# Patient Record
Sex: FEMALE | Race: WHITE | Employment: UNEMPLOYED | URBAN - NONMETROPOLITAN AREA
[De-identification: names, ages, dates, MRNs, and addresses within clinical notes are randomized per-mention and may not be internally consistent; named-entity substitution may affect disease eponyms.]

---

## 2020-02-11 ENCOUNTER — HOSPITAL ENCOUNTER (OUTPATIENT)
Age: 20
Discharge: HOME | End: 2020-02-11
Attending: OBSTETRICS & GYNECOLOGY | Admitting: OBSTETRICS & GYNECOLOGY
Payer: MEDICAID

## 2020-02-11 VITALS
RESPIRATION RATE: 18 BRPM | DIASTOLIC BLOOD PRESSURE: 77 MMHG | TEMPERATURE: 97.7 F | SYSTOLIC BLOOD PRESSURE: 131 MMHG | BODY MASS INDEX: 45.99 KG/M2 | HEIGHT: 67 IN | OXYGEN SATURATION: 99 % | HEART RATE: 98 BPM | WEIGHT: 293 LBS

## 2020-02-11 PROBLEM — R10.9 ABDOMINAL PAIN: Status: ACTIVE | Noted: 2020-02-11

## 2020-02-11 LAB
BILIRUBIN URINE: NEGATIVE
BLOOD, URINE: NEGATIVE
CHARACTER, URINE: CLEAR
COLOR: YELLOW
GLUCOSE URINE: NEGATIVE MG/DL
KETONES, URINE: NEGATIVE
LEUKOCYTE ESTERASE, URINE: NEGATIVE
NITRITE, URINE: NEGATIVE
PH UA: 6.5 (ref 5–9)
PROTEIN UA: NEGATIVE
SPECIFIC GRAVITY, URINE: 1.01 (ref 1–1.03)
UROBILINOGEN, URINE: 0.2 EU/DL (ref 0–1)

## 2020-02-11 PROCEDURE — 2709999900 HC NON-CHARGEABLE SUPPLY

## 2020-02-11 PROCEDURE — 81003 URINALYSIS AUTO W/O SCOPE: CPT

## 2020-02-11 RX ORDER — FERROUS SULFATE 325(65) MG
325 TABLET ORAL
COMMUNITY
End: 2021-09-03

## 2020-02-11 SDOH — HEALTH STABILITY: MENTAL HEALTH: HOW OFTEN DO YOU HAVE A DRINK CONTAINING ALCOHOL?: NEVER

## 2020-02-11 NOTE — FLOWSHEET NOTE
Pt arrives with complaint of sharp pain in lower back for several days, states it is constant, but hasn't had it the entire time, has not tried anything for pain such as tylenol or heating pad, also complaint of abdominal pressure that is constant in her \"crotch\", denies uti symptoms, has been feeling baby move, denies cramping and contractions, denies rom, also states that she had a 4D ultrasound done on 1/24/20 and was told she was 35 weeks then, reviewed prenatal and pt had early ultrasound done and would be 35 2/7 weeks now, pt and her mother say that she is tired of being pregnant. Denies headache, blurred vision and epigastric pain. Explained patients right to have family, representative or physician notified of their admission. Patient has Declined for physician to be notified. Patient has Declined for family/representative to be notified.

## 2020-02-11 NOTE — FLOWSHEET NOTE
Discharge instructions given and pt verbalizes understanding, instructed that she could use heating pad on low, warm bath and take tylenol if needed for pain and discomfort,

## 2020-02-11 NOTE — FLOWSHEET NOTE
Plan of care discussed, pt and her mother verbalize understanding, then straight cath for moderate amount clear yellow urine and specimen to lab

## 2020-02-18 ENCOUNTER — HOSPITAL ENCOUNTER (OUTPATIENT)
Age: 20
Discharge: HOME | End: 2020-02-18
Attending: OBSTETRICS & GYNECOLOGY | Admitting: OBSTETRICS & GYNECOLOGY
Payer: MEDICAID

## 2020-02-18 VITALS
BODY MASS INDEX: 51.69 KG/M2 | DIASTOLIC BLOOD PRESSURE: 79 MMHG | RESPIRATION RATE: 22 BRPM | TEMPERATURE: 97.6 F | SYSTOLIC BLOOD PRESSURE: 139 MMHG | WEIGHT: 293 LBS | HEART RATE: 88 BPM

## 2020-02-18 PROBLEM — R10.9 ABDOMINAL PAIN AFFECTING PREGNANCY: Status: ACTIVE | Noted: 2020-02-18

## 2020-02-18 PROBLEM — O26.899 ABDOMINAL PAIN AFFECTING PREGNANCY: Status: ACTIVE | Noted: 2020-02-18

## 2020-02-18 LAB
BILIRUBIN URINE: NEGATIVE
BLOOD, URINE: NEGATIVE
CHARACTER, URINE: CLEAR
COLOR: YELLOW
GLUCOSE URINE: NEGATIVE MG/DL
KETONES, URINE: NEGATIVE
LEUKOCYTE ESTERASE, URINE: NEGATIVE
NITRITE, URINE: NEGATIVE
PH UA: 7 (ref 5–9)
PROTEIN UA: NEGATIVE
SPECIFIC GRAVITY, URINE: 1 (ref 1–1.03)
UROBILINOGEN, URINE: 0.2 EU/DL (ref 0–1)

## 2020-02-18 PROCEDURE — 2709999900 HC NON-CHARGEABLE SUPPLY

## 2020-02-18 PROCEDURE — 81003 URINALYSIS AUTO W/O SCOPE: CPT

## 2020-02-18 PROCEDURE — 6370000000 HC RX 637 (ALT 250 FOR IP): Performed by: OBSTETRICS & GYNECOLOGY

## 2020-02-18 RX ORDER — ACETAMINOPHEN 500 MG
1000 TABLET ORAL ONCE
Status: COMPLETED | OUTPATIENT
Start: 2020-02-18 | End: 2020-02-18

## 2020-02-18 RX ADMIN — ACETAMINOPHEN 1000 MG: 500 TABLET ORAL at 19:14

## 2020-02-18 ASSESSMENT — PAIN DESCRIPTION - DESCRIPTORS: DESCRIPTORS: CONSTANT

## 2020-02-18 NOTE — FLOWSHEET NOTE
Plan of care discussed with pt. Cath urine and tylenol. States she does not have to urinate yet. Instructed to call when she has to go.

## 2020-02-18 NOTE — FLOWSHEET NOTE
Gr 2 P 0 admitted to labor and delivery at 36 wks 2 days complaining of pain of 6 on right side of abdomen. Abdomen tender when palpating entire right side where baby is lying. States pain in worse when standing. EFM applied. Also stated she has a headache of 7. Has not taken anything for pain.

## 2020-02-19 NOTE — FLOWSHEET NOTE
Dr. Taina Jackson on the phone. Informed on urine results WDL. EFM reactive. No contractions noted. Pt resting comfortably. Tylenol given. Orders received. Over the last 2 weeks, how often have you been bothered by the following problems?         PHQ2 Score:  0  1. Little interest or pleasure in doing things?:  Not at all  2. Feeling down, depressed, or hopeless?:  Not at all     3. Trouble falling, staying asleep or sleeping too much?:  Not at all  4. Feeling tired or having little energy?:  Not at all  5. Poor appetite or overeating?:  Not at all  7. Trouble concentrating on things such as reading a newspaper or watching television?:  Not at all  8. Moving or speaking so slowly that other people could have noticed? Or the opposite - being so fidgety or restless that you were moving around a lot more than usual?:  Not at all  9. Thoughts that you would be better off dead, or of hurting yourself in some way?:  Not at all

## 2020-02-19 NOTE — PLAN OF CARE
Problem: Pain:  Goal: Pain level will decrease  Description  Pain level will decrease  Outcome: Ongoing  Note:   Monitoring pt's pain. Medications given. Oral hydration encouraged. Goal: Control of acute pain  Description  Control of acute pain  Outcome: Ongoing  Goal: Control of chronic pain  Description  Control of chronic pain  Outcome: Ongoing     Problem: Healthcare acquired conditions:  Goal: Absence of healthcare acquired conditions  Description  Absence of healthcare acquired conditions  Outcome: Ongoing  Note:   EFM reactive. Vitals stable,      Problem: Discharge Planning:  Goal: Discharged to appropriate level of care  Description  Discharged to appropriate level of care  Outcome: Ongoing  Note:   Monitoring pt's discharge needs. Plans to be discharged when medically stable. Care plan reviewed with patient and family. Patient and family verbalize understanding of the plan of care and contribute to goal setting.

## 2020-02-28 ENCOUNTER — HOSPITAL ENCOUNTER (OUTPATIENT)
Age: 20
Discharge: HOME | End: 2020-02-28
Attending: OBSTETRICS & GYNECOLOGY | Admitting: OBSTETRICS & GYNECOLOGY
Payer: MEDICAID

## 2020-02-28 VITALS
DIASTOLIC BLOOD PRESSURE: 97 MMHG | SYSTOLIC BLOOD PRESSURE: 135 MMHG | BODY MASS INDEX: 45.99 KG/M2 | HEART RATE: 97 BPM | OXYGEN SATURATION: 98 % | TEMPERATURE: 97.9 F | WEIGHT: 293 LBS | HEIGHT: 67 IN | RESPIRATION RATE: 16 BRPM

## 2020-02-28 PROBLEM — O26.90 PREGNANCY WITH COMPLICATION: Status: ACTIVE | Noted: 2020-02-28

## 2020-02-28 LAB
ALBUMIN SERPL-MCNC: 3.6 G/DL (ref 3.5–5.1)
ALP BLD-CCNC: 104 U/L (ref 38–126)
ALT SERPL-CCNC: 7 U/L (ref 11–66)
AST SERPL-CCNC: 10 U/L (ref 5–40)
BILIRUB SERPL-MCNC: 0.4 MG/DL (ref 0.3–1.2)
BILIRUBIN DIRECT: < 0.2 MG/DL (ref 0–0.3)
CREATININE URINE: 218.3 MG/DL
ERYTHROCYTE [DISTWIDTH] IN BLOOD BY AUTOMATED COUNT: 15.8 % (ref 11.5–14.5)
ERYTHROCYTE [DISTWIDTH] IN BLOOD BY AUTOMATED COUNT: 51.1 FL (ref 35–45)
HCT VFR BLD CALC: 31.6 % (ref 37–47)
HEMOGLOBIN: 10.2 GM/DL (ref 12–16)
MCH RBC QN AUTO: 28.9 PG (ref 26–33)
MCHC RBC AUTO-ENTMCNC: 32.3 GM/DL (ref 32.2–35.5)
MCV RBC AUTO: 89.5 FL (ref 81–99)
PLATELET # BLD: 310 THOU/MM3 (ref 130–400)
PMV BLD AUTO: 9 FL (ref 9.4–12.4)
PROT/CREAT RATIO, UR: 0.2
PROTEIN, URINE: 44.7 MG/DL
RBC # BLD: 3.53 MILL/MM3 (ref 4.2–5.4)
TOTAL PROTEIN: 6.4 G/DL (ref 6.1–8)
WBC # BLD: 14.5 THOU/MM3 (ref 4.8–10.8)

## 2020-02-28 PROCEDURE — 36415 COLL VENOUS BLD VENIPUNCTURE: CPT

## 2020-02-28 PROCEDURE — 82570 ASSAY OF URINE CREATININE: CPT

## 2020-02-28 PROCEDURE — 6370000000 HC RX 637 (ALT 250 FOR IP): Performed by: OBSTETRICS & GYNECOLOGY

## 2020-02-28 PROCEDURE — 84156 ASSAY OF PROTEIN URINE: CPT

## 2020-02-28 PROCEDURE — 80076 HEPATIC FUNCTION PANEL: CPT

## 2020-02-28 PROCEDURE — 85027 COMPLETE CBC AUTOMATED: CPT

## 2020-02-28 RX ORDER — BUTALBITAL, ACETAMINOPHEN AND CAFFEINE 50; 325; 40 MG/1; MG/1; MG/1
2 TABLET ORAL ONCE
Status: COMPLETED | OUTPATIENT
Start: 2020-02-28 | End: 2020-02-28

## 2020-02-28 RX ADMIN — BUTALBITAL, ACETAMINOPHEN, AND CAFFEINE 2 TABLET: 50; 325; 40 TABLET ORAL at 16:44

## 2020-02-28 ASSESSMENT — PAIN SCALES - GENERAL: PAINLEVEL_OUTOF10: 7

## 2020-02-28 ASSESSMENT — PAIN DESCRIPTION - DESCRIPTORS: DESCRIPTORS: CONSTANT

## 2020-02-28 NOTE — FLOWSHEET NOTE
Patient to bathroom to void, informed of maternal drug testing policy in place on all laboring patients. Verbal consent received, paper consent to be signed and urine to be sent. Explained patients right to have family, representative or physician notified of their admission. Patient has Declined for physician to be notified. Patient has Declined for family/representative to be notified.

## 2020-02-28 NOTE — FLOWSHEET NOTE
Pt discharged to home with instructions.  Pt states understanding and is aware of when to return to hospital.

## 2020-02-29 NOTE — PROCEDURES
Department of Obstetrics and Gynecology  Labor and Delivery   Triage Note  Colt Frias D.O.      Pt Name: Debra Rinne  MRN: [id number] [place] #: [de-identified]  YOB: 2000  Date of Service 20    SUBJECTIVE: This 23yo  patient presented at 28 week+2 complaining of abdominal pressure and back pain. Workup was negative    OBJECTIVE    Fetal heart rate:         Baseline Heart Rate:  150        Accelerations:  present       Decelerations:  none       Variability:  moderate    Contraction frequency: nil  The NST was reactive level 1      ASSESSMENT & PLAN:  Discharged to home in a stable condition and instructed to follow up at her next scheduled appointment. Romeo GUILLEN

## 2020-03-04 ENCOUNTER — APPOINTMENT (OUTPATIENT)
Dept: GENERAL RADIOLOGY | Age: 20
End: 2020-03-04
Payer: MEDICAID

## 2020-03-04 ENCOUNTER — HOSPITAL ENCOUNTER (EMERGENCY)
Age: 20
Discharge: HOME | End: 2020-03-04
Payer: MEDICAID

## 2020-03-04 VITALS
TEMPERATURE: 98.4 F | BODY MASS INDEX: 45.99 KG/M2 | HEART RATE: 80 BPM | SYSTOLIC BLOOD PRESSURE: 146 MMHG | WEIGHT: 293 LBS | RESPIRATION RATE: 14 BRPM | DIASTOLIC BLOOD PRESSURE: 91 MMHG | OXYGEN SATURATION: 97 % | HEIGHT: 67 IN
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
## 2020-03-04 PROCEDURE — 6370000000 HC RX 637 (ALT 250 FOR IP): Performed by: PHYSICIAN ASSISTANT

## 2020-03-04 PROCEDURE — 73610 X-RAY EXAM OF ANKLE: CPT

## 2020-03-04 PROCEDURE — 99285 EMERGENCY DEPT VISIT HI MDM: CPT

## 2020-03-04 PROCEDURE — 73110 X-RAY EXAM OF WRIST: CPT

## 2020-03-04 RX ORDER — ACETAMINOPHEN 325 MG/1
650 TABLET ORAL ONCE
Status: COMPLETED | OUTPATIENT
Start: 2020-03-04 | End: 2020-03-04

## 2020-03-04 RX ADMIN — ACETAMINOPHEN 650 MG: 325 TABLET ORAL at 11:04

## 2020-03-04 ASSESSMENT — ENCOUNTER SYMPTOMS: ABDOMINAL PAIN: 0

## 2020-03-04 ASSESSMENT — PAIN DESCRIPTION - LOCATION
LOCATION: WRIST;ANKLE
LOCATION: WRIST;ANKLE

## 2020-03-04 ASSESSMENT — PAIN SCALES - GENERAL
PAINLEVEL_OUTOF10: 8
PAINLEVEL_OUTOF10: 6
PAINLEVEL_OUTOF10: 8

## 2020-03-04 ASSESSMENT — PAIN DESCRIPTION - ORIENTATION
ORIENTATION: RIGHT
ORIENTATION: RIGHT

## 2020-03-04 ASSESSMENT — PAIN DESCRIPTION - PROGRESSION: CLINICAL_PROGRESSION: GRADUALLY IMPROVING

## 2020-03-04 ASSESSMENT — PAIN DESCRIPTION - PAIN TYPE
TYPE: ACUTE PAIN
TYPE: ACUTE PAIN

## 2020-03-04 ASSESSMENT — PAIN DESCRIPTION - FREQUENCY
FREQUENCY: CONTINUOUS
FREQUENCY: CONTINUOUS

## 2020-03-04 ASSESSMENT — PAIN DESCRIPTION - DESCRIPTORS
DESCRIPTORS: ACHING
DESCRIPTORS: ACHING

## 2020-03-04 NOTE — ED PROVIDER NOTES
oropharyngeal exudate or posterior oropharyngeal erythema. Eyes:      Conjunctiva/sclera: Conjunctivae normal.   Neck:      Musculoskeletal: Normal range of motion and neck supple. Vascular: No JVD. Cardiovascular:      Rate and Rhythm: Normal rate and regular rhythm. Pulses: Normal pulses. Heart sounds: Normal heart sounds. No murmur. No friction rub. No gallop. Pulmonary:      Effort: Pulmonary effort is normal. No respiratory distress. Breath sounds: Normal breath sounds. No decreased breath sounds, wheezing, rhonchi or rales. Abdominal:      General: Bowel sounds are normal. There is no distension. Palpations: Abdomen is soft. Tenderness: There is no abdominal tenderness. There is no guarding or rebound. Musculoskeletal:      Right wrist: She exhibits decreased range of motion and tenderness (snuff box ). Right ankle: She exhibits decreased range of motion and swelling. Tenderness. Skin:     General: Skin is warm and dry. Findings: No rash. Neurological:      Mental Status: She is alert and oriented to person, place, and time. Motor: No abnormal muscle tone. Coordination: Coordination normal.         DIAGNOSTIC RESULTS     EKG:(none if blank)  All EKG's are interpreted [facility] who either signs or Co-signs this chart in the absence of a cardiologist.    none    RADIOLOGY: (none if blank)   Interpretation per the Radiologistbelow, if available at the time of this note:    XR ANKLE RIGHT (MIN 3 VIEWS)   Final Result   No acute abnormality. **This report has been created using voice recognition software. It may contain minor errors which are inherent in voice recognition technology. **      Final report electronically signed by Dr. Nicko Garzon on 3/4/2020 10:31 AM      XR WRIST RIGHT (MIN 3 VIEWS)   Final Result   No acute process            **This report has been created using voice recognition software.   It may contain minor errors which are inherent in voice recognition technology. **      Final report electronically signed by Dr. Apoorva Enciso on 3/4/2020 10:32 AM          LABS:  Labs Reviewed - No data to display    All other labs were within normal range or not returned as of this dictation. Please note, any cultures that may have been sent were not resulted at the time of this patient visit. EMERGENCY DEPARTMENT COURSE andMedical Decision Making:     MDM/   Is a 25-year-old female patient who is currently 38 weeks pregnant. Patient was evaluated in labor and delivery and the patient's OB/GYN was contacted. At this time there is no risk to the baby, and vitals were within normal limits so the patient was returned to the ED for application of splints to her affected areas. Due to the patient having snuffbox tenderness we will apply a thumb spica splint and recommend that she follow-up with orthopedics within the following week for evaluation for a possible scaphoid fracture. The patient's right hand and wrist x-ray was negative. The patient's right ankle and foot x-rays were negative. Some swelling noted about the lateral right ankle however there was no bruising within at this time. The patient's injury is consistent with a grade 1 ankle sprain. Recommend that the patient be placed in a stirrup splint or ankle stabilization. I recommend that she follow-up with [facility] for evaluation of her ankle as well. I educated the patient on avoidance of use of NSAIDs as this could pose a risk to her baby. Patient states that she normally does not take anything for pain however I told her it would be okay to take Tylenol as directed by her OB/GYN. Patient was in agreement with this plan and demonstrated understanding. Strict return precautions and follow up instructions were discussed with the patient with which the patient agrees. Dr. Norbert Desouza was present and participated in the care of this patient.      ED Medications administered this visit:    Medications   acetaminophen (TYLENOL) tablet 650 mg (650 mg Oral Given 3/4/20 1106)         Procedures: (None if blank)       CLINICAL       1. Mild sprain of right ankle, initial encounter    2. Wrist pain, acute, right          DISPOSITION/PLAN   DISPOSITION    Home    PATIENT [facility]:  [facility] [place] [address] [facility]  [phone]  Schedule an appointment as soon as possible for a visit in 3 days  For wrist evaluation r/t possible scaphoid fx and ankle f/u      DISCHARGE MEDICATIONS:  New Prescriptions    No medications on file              (Please note that portions of this note were completed with a voice recognition program.  Efforts were made to edit the dictations but occasionallywords are mis-transcribed.)    Michell Graff PA-C   (electronically signed)      Scribe:  Royer Delaney(Name) 3/4/20 10:13 AM Scribing for and in the presence of Michell Graff PA-C.     Signed by: Royer Delaney(Name), [place], 03/04/20 12:00 PM         RASHI Mcneal  03/04/20 1200

## 2020-03-04 NOTE — ED TRIAGE NOTES
Patient presents to ER with complaints of right ankle injury and right wrist pain after having a fall this morning at 0430. Patient reports being 38 weeks and 4 days pregnant.  Patient was already evaluated by labor & delivery and brought down for further evaluation of ankle and wrist.

## 2020-03-05 ENCOUNTER — HOSPITAL ENCOUNTER (OUTPATIENT)
Age: 20
Discharge: HOME | End: 2020-03-05
Attending: OBSTETRICS & GYNECOLOGY | Admitting: OBSTETRICS & GYNECOLOGY
Payer: MEDICAID

## 2020-03-05 ENCOUNTER — HOSPITAL ENCOUNTER (EMERGENCY)
Age: 20
Discharge: HOME | End: 2020-03-05
Payer: MEDICAID

## 2020-03-05 VITALS
HEART RATE: 85 BPM | OXYGEN SATURATION: 98 % | SYSTOLIC BLOOD PRESSURE: 132 MMHG | DIASTOLIC BLOOD PRESSURE: 84 MMHG | BODY MASS INDEX: 38.45 KG/M2 | WEIGHT: 245 LBS | RESPIRATION RATE: 16 BRPM | HEIGHT: 67 IN | TEMPERATURE: 98.6 F

## 2020-03-05 VITALS
RESPIRATION RATE: 18 BRPM | HEART RATE: 78 BPM | WEIGHT: 293 LBS | TEMPERATURE: 97.1 F | DIASTOLIC BLOOD PRESSURE: 83 MMHG | SYSTOLIC BLOOD PRESSURE: 145 MMHG | HEIGHT: 67 IN | OXYGEN SATURATION: 98 % | BODY MASS INDEX: 45.99 KG/M2

## 2020-03-05 PROBLEM — R42 DIZZY: Status: ACTIVE | Noted: 2020-03-05

## 2020-03-05 LAB
ABO: NORMAL
ALBUMIN SERPL-MCNC: 3.3 G/DL (ref 3.5–5.1)
ALP BLD-CCNC: 109 U/L (ref 38–126)
ALT SERPL-CCNC: 7 U/L (ref 11–66)
ANION GAP SERPL CALCULATED.3IONS-SCNC: 13 MEQ/L (ref 8–16)
ANTIBODY SCREEN: NORMAL
AST SERPL-CCNC: 11 U/L (ref 5–40)
BILIRUB SERPL-MCNC: 0.4 MG/DL (ref 0.3–1.2)
BUN BLDV-MCNC: 9 MG/DL (ref 7–22)
CALCIUM SERPL-MCNC: 9.6 MG/DL (ref 8.5–10.5)
CHLORIDE BLD-SCNC: 103 MEQ/L (ref 98–111)
CO2: 21 MEQ/L (ref 23–33)
CREAT SERPL-MCNC: 0.3 MG/DL (ref 0.4–1.2)
EKG ATRIAL RATE: 79 BPM
EKG ATRIAL RATE: 84 BPM
EKG P AXIS: 30 DEGREES
EKG P AXIS: 46 DEGREES
EKG P-R INTERVAL: 146 MS
EKG P-R INTERVAL: 150 MS
EKG Q-T INTERVAL: 360 MS
EKG Q-T INTERVAL: 366 MS
EKG QRS DURATION: 74 MS
EKG QRS DURATION: 76 MS
EKG QTC CALCULATION (BAZETT): 412 MS
EKG QTC CALCULATION (BAZETT): 432 MS
EKG R AXIS: 33 DEGREES
EKG R AXIS: 42 DEGREES
EKG T AXIS: 27 DEGREES
EKG T AXIS: 31 DEGREES
EKG VENTRICULAR RATE: 79 BPM
EKG VENTRICULAR RATE: 84 BPM
ERYTHROCYTE [DISTWIDTH] IN BLOOD BY AUTOMATED COUNT: 15.5 % (ref 11.5–14.5)
ERYTHROCYTE [DISTWIDTH] IN BLOOD BY AUTOMATED COUNT: 50.4 FL (ref 35–45)
GFR SERPL CREATININE-BSD FRML MDRD: > 90 ML/MIN/1.73M2
GLUCOSE BLD-MCNC: 83 MG/DL (ref 70–108)
HCT VFR BLD CALC: 32.9 % (ref 37–47)
HEMOGLOBIN: 10.6 GM/DL (ref 12–16)
MCH RBC QN AUTO: 28.9 PG (ref 26–33)
MCHC RBC AUTO-ENTMCNC: 32.2 GM/DL (ref 32.2–35.5)
MCV RBC AUTO: 89.6 FL (ref 81–99)
PLATELET # BLD: 306 THOU/MM3 (ref 130–400)
PMV BLD AUTO: 9.4 FL (ref 9.4–12.4)
POTASSIUM SERPL-SCNC: 4.9 MEQ/L (ref 3.5–5.2)
RBC # BLD: 3.67 MILL/MM3 (ref 4.2–5.4)
RH FACTOR: NORMAL
SODIUM BLD-SCNC: 137 MEQ/L (ref 135–145)
TOTAL PROTEIN: 6.5 G/DL (ref 6.1–8)
WBC # BLD: 13.6 THOU/MM3 (ref 4.8–10.8)

## 2020-03-05 PROCEDURE — 2709999900 HC NON-CHARGEABLE SUPPLY

## 2020-03-05 PROCEDURE — 6360000002 HC RX W HCPCS: Performed by: NURSE PRACTITIONER

## 2020-03-05 PROCEDURE — 2580000003 HC RX 258: Performed by: NURSE PRACTITIONER

## 2020-03-05 PROCEDURE — 85027 COMPLETE CBC AUTOMATED: CPT

## 2020-03-05 PROCEDURE — 93005 ELECTROCARDIOGRAM TRACING: CPT | Performed by: OBSTETRICS & GYNECOLOGY

## 2020-03-05 PROCEDURE — 2580000003 HC RX 258: Performed by: OBSTETRICS & GYNECOLOGY

## 2020-03-05 PROCEDURE — 93005 ELECTROCARDIOGRAM TRACING: CPT | Performed by: NURSE PRACTITIONER

## 2020-03-05 PROCEDURE — 80053 COMPREHEN METABOLIC PANEL: CPT

## 2020-03-05 PROCEDURE — 86900 BLOOD TYPING SEROLOGIC ABO: CPT

## 2020-03-05 PROCEDURE — 36415 COLL VENOUS BLD VENIPUNCTURE: CPT

## 2020-03-05 PROCEDURE — 86850 RBC ANTIBODY SCREEN: CPT

## 2020-03-05 PROCEDURE — 86901 BLOOD TYPING SEROLOGIC RH(D): CPT

## 2020-03-05 PROCEDURE — 6370000000 HC RX 637 (ALT 250 FOR IP): Performed by: NURSE PRACTITIONER

## 2020-03-05 PROCEDURE — 93010 ELECTROCARDIOGRAM REPORT: CPT | Performed by: NUCLEAR MEDICINE

## 2020-03-05 PROCEDURE — 96374 THER/PROPH/DIAG INJ IV PUSH: CPT

## 2020-03-05 PROCEDURE — 86592 SYPHILIS TEST NON-TREP QUAL: CPT

## 2020-03-05 PROCEDURE — 96360 HYDRATION IV INFUSION INIT: CPT

## 2020-03-05 PROCEDURE — 99284 EMERGENCY DEPT VISIT MOD MDM: CPT

## 2020-03-05 PROCEDURE — 96361 HYDRATE IV INFUSION ADD-ON: CPT

## 2020-03-05 PROCEDURE — 99283 EMERGENCY DEPT VISIT LOW MDM: CPT

## 2020-03-05 RX ORDER — SODIUM CHLORIDE, SODIUM LACTATE, POTASSIUM CHLORIDE, CALCIUM CHLORIDE 600; 310; 30; 20 MG/100ML; MG/100ML; MG/100ML; MG/100ML
INJECTION, SOLUTION INTRAVENOUS CONTINUOUS
Status: DISCONTINUED | OUTPATIENT
Start: 2020-03-05 | End: 2020-03-05 | Stop reason: HOSPADM

## 2020-03-05 RX ORDER — 0.9 % SODIUM CHLORIDE 0.9 %
1000 INTRAVENOUS SOLUTION INTRAVENOUS ONCE
Status: COMPLETED | OUTPATIENT
Start: 2020-03-05 | End: 2020-03-05

## 2020-03-05 RX ORDER — METOCLOPRAMIDE HYDROCHLORIDE 5 MG/ML
10 INJECTION INTRAMUSCULAR; INTRAVENOUS ONCE
Status: COMPLETED | OUTPATIENT
Start: 2020-03-05 | End: 2020-03-05

## 2020-03-05 RX ORDER — ACETAMINOPHEN 500 MG
1000 TABLET ORAL ONCE
Status: COMPLETED | OUTPATIENT
Start: 2020-03-05 | End: 2020-03-05

## 2020-03-05 RX ORDER — MECLIZINE HYDROCHLORIDE CHEWABLE TABLETS 25 MG/1
25 TABLET, CHEWABLE ORAL ONCE
Status: COMPLETED | OUTPATIENT
Start: 2020-03-05 | End: 2020-03-05

## 2020-03-05 RX ADMIN — SODIUM CHLORIDE 1000 ML: 9 INJECTION, SOLUTION INTRAVENOUS at 17:59

## 2020-03-05 RX ADMIN — METOCLOPRAMIDE 10 MG: 5 INJECTION, SOLUTION INTRAMUSCULAR; INTRAVENOUS at 17:54

## 2020-03-05 RX ADMIN — SODIUM CHLORIDE, POTASSIUM CHLORIDE, SODIUM LACTATE AND CALCIUM CHLORIDE: 600; 310; 30; 20 INJECTION, SOLUTION INTRAVENOUS at 14:25

## 2020-03-05 RX ADMIN — MECLIZINE HCL 25 MG: 25 TABLET, CHEWABLE ORAL at 17:54

## 2020-03-05 RX ADMIN — SODIUM CHLORIDE, POTASSIUM CHLORIDE, SODIUM LACTATE AND CALCIUM CHLORIDE: 600; 310; 30; 20 INJECTION, SOLUTION INTRAVENOUS at 15:21

## 2020-03-05 RX ADMIN — ACETAMINOPHEN 1000 MG: 500 TABLET ORAL at 19:40

## 2020-03-05 ASSESSMENT — PAIN SCALES - GENERAL
PAINLEVEL_OUTOF10: 8
PAINLEVEL_OUTOF10: 7

## 2020-03-05 ASSESSMENT — PAIN DESCRIPTION - LOCATION: LOCATION: HEAD

## 2020-03-05 ASSESSMENT — PAIN DESCRIPTION - PAIN TYPE: TYPE: ACUTE PAIN

## 2020-03-05 NOTE — FLOWSHEET NOTE
Dr Dar Leonard phoned in and notified of lab results, she viewed than and bp from the office, informed that pt states her dizziness is about the same as when she came in, had 1500 ml iv fluid infused, plan is to have pt evaluated in ER for being dizzy

## 2020-03-05 NOTE — ED NOTES
Pt resting in bed upon entering room and appears in no distress. Pt reports that her headache is 7/10 at this time. Pt informed of further orders placed and completed at this time. Parent at the bedside states concern for pt's BP which was reassessed and remains slightly high. Pt reports that over the last 48 hours she has not taken any tylenol for pain to the right wrist and or ankle. Another ekg obtained at this time. Call light remains in reach with sr up x2.      Annetta Cockayne, RN  03/05/20 3959

## 2020-03-05 NOTE — ED NOTES
Bed: 031A  Expected date: 3/5/20  Expected time: 4:20 PM  Means of arrival:   Comments:  April Lam RN  03/05/20 [id number]

## 2020-03-05 NOTE — ED TRIAGE NOTES
Pt to ed from L&D for further evaluations of dizziness for the last 48 hours. L&D reports that pt was brought to them from Dr. Venu Hassan office for evaluation and fluids. Pt reports that an ekg was obtained in L&D and noted 1600 ml's NS has been received and is still infusing since arrival to the ED. Pt reports headache rating pain 7/10. Pt reports that she was evaluated here 3-4-2020 for a fall that occurred due to the dizziness injuring her right wrist and ankle. Pt reports that she has taken no medications for her pain. Pt waiting on provider to discuss the POC. Pt requesting something to eat and drink however encouraged to wait till she is seen by a provider. Call light in reach and lights dimmed for comfort.

## 2020-03-05 NOTE — FLOWSHEET NOTE
Pt arrives from dr Berumen office,  called prior to her arrival with orders, pt was in l/d yesterday after a fall and then went to the ER for a sore right wrist and foot, they xrayed her and nothing was broke, and then was discharged, she says she fell because she was dizzy, plan is to do lab work, iv and ekg, patient's mother with her

## 2020-03-06 LAB — RPR: NONREACTIVE

## 2020-03-09 ENCOUNTER — ANESTHESIA EVENT (OUTPATIENT)
Dept: LABOR AND DELIVERY | Age: 20
DRG: 560 | End: 2020-03-09
Payer: MEDICAID

## 2020-03-09 ENCOUNTER — HOSPITAL ENCOUNTER (INPATIENT)
Age: 20
LOS: 3 days | Discharge: HOME | DRG: 560 | End: 2020-03-12
Attending: OBSTETRICS & GYNECOLOGY | Admitting: OBSTETRICS & GYNECOLOGY
Payer: MEDICAID

## 2020-03-09 ENCOUNTER — ANESTHESIA (OUTPATIENT)
Dept: LABOR AND DELIVERY | Age: 20
DRG: 560 | End: 2020-03-09
Payer: MEDICAID

## 2020-03-09 LAB
ABO: NORMAL
ALBUMIN SERPL-MCNC: 3.6 G/DL (ref 3.5–5.1)
ALP BLD-CCNC: 113 U/L (ref 38–126)
ALT SERPL-CCNC: 8 U/L (ref 11–66)
AMPHETAMINE+METHAMPHETAMINE URINE SCREEN: NEGATIVE
ANTIBODY SCREEN: NORMAL
AST SERPL-CCNC: 12 U/L (ref 5–40)
BARBITURATE QUANTITATIVE URINE: NEGATIVE
BASOPHILS # BLD: 0.1 %
BASOPHILS ABSOLUTE: 0 THOU/MM3 (ref 0–0.1)
BENZODIAZEPINE QUANTITATIVE URINE: NEGATIVE
BILIRUB SERPL-MCNC: 0.4 MG/DL (ref 0.3–1.2)
BILIRUBIN DIRECT: < 0.2 MG/DL (ref 0–0.3)
CANNABINOID QUANTITATIVE URINE: NEGATIVE
COCAINE METABOLITE QUANTITATIVE URINE: NEGATIVE
CREATININE URINE: 36.4 MG/DL
EOSINOPHIL # BLD: 1.3 %
EOSINOPHILS ABSOLUTE: 0.2 THOU/MM3 (ref 0–0.4)
ERYTHROCYTE [DISTWIDTH] IN BLOOD BY AUTOMATED COUNT: 15.5 % (ref 11.5–14.5)
ERYTHROCYTE [DISTWIDTH] IN BLOOD BY AUTOMATED COUNT: 49.4 FL (ref 35–45)
HCT VFR BLD CALC: 34 % (ref 37–47)
HEMOGLOBIN: 11 GM/DL (ref 12–16)
IMMATURE GRANS (ABS): 0.09 THOU/MM3 (ref 0–0.07)
IMMATURE GRANULOCYTES: 0.7 %
LYMPHOCYTES # BLD: 11 %
LYMPHOCYTES ABSOLUTE: 1.5 THOU/MM3 (ref 1–4.8)
MCH RBC QN AUTO: 28.4 PG (ref 26–33)
MCHC RBC AUTO-ENTMCNC: 32.4 GM/DL (ref 32.2–35.5)
MCV RBC AUTO: 87.9 FL (ref 81–99)
MONOCYTES # BLD: 9.1 %
MONOCYTES ABSOLUTE: 1.2 THOU/MM3 (ref 0.4–1.3)
NUCLEATED RED BLOOD CELLS: 0 /100 WBC
OPIATES, URINE: NEGATIVE
OXYCODONE: NEGATIVE
PHENCYCLIDINE QUANTITATIVE URINE: NEGATIVE
PLATELET # BLD: 304 THOU/MM3 (ref 130–400)
PMV BLD AUTO: 9.5 FL (ref 9.4–12.4)
PROT/CREAT RATIO, UR: 0.39
PROTEIN, URINE: 14.1 MG/DL
RBC # BLD: 3.87 MILL/MM3 (ref 4.2–5.4)
RH FACTOR: NORMAL
SEG NEUTROPHILS: 77.8 %
SEGMENTED NEUTROPHILS ABSOLUTE COUNT: 10.4 THOU/MM3 (ref 1.8–7.7)
TOTAL PROTEIN: 6.4 G/DL (ref 6.1–8)
WBC # BLD: 13.4 THOU/MM3 (ref 4.8–10.8)

## 2020-03-09 PROCEDURE — 2709999900 HC NON-CHARGEABLE SUPPLY

## 2020-03-09 PROCEDURE — 86850 RBC ANTIBODY SCREEN: CPT

## 2020-03-09 PROCEDURE — 86901 BLOOD TYPING SEROLOGIC RH(D): CPT

## 2020-03-09 PROCEDURE — 1220000001 HC SEMI PRIVATE L&D R&B

## 2020-03-09 PROCEDURE — 6360000002 HC RX W HCPCS

## 2020-03-09 PROCEDURE — 6360000002 HC RX W HCPCS: Performed by: OBSTETRICS & GYNECOLOGY

## 2020-03-09 PROCEDURE — 85025 COMPLETE CBC W/AUTO DIFF WBC: CPT

## 2020-03-09 PROCEDURE — 36415 COLL VENOUS BLD VENIPUNCTURE: CPT

## 2020-03-09 PROCEDURE — 80076 HEPATIC FUNCTION PANEL: CPT

## 2020-03-09 PROCEDURE — 86592 SYPHILIS TEST NON-TREP QUAL: CPT

## 2020-03-09 PROCEDURE — 82570 ASSAY OF URINE CREATININE: CPT

## 2020-03-09 PROCEDURE — 2580000003 HC RX 258: Performed by: OBSTETRICS & GYNECOLOGY

## 2020-03-09 PROCEDURE — 84156 ASSAY OF PROTEIN URINE: CPT

## 2020-03-09 PROCEDURE — 86900 BLOOD TYPING SEROLOGIC ABO: CPT

## 2020-03-09 PROCEDURE — 2580000003 HC RX 258

## 2020-03-09 PROCEDURE — 80307 DRUG TEST PRSMV CHEM ANLYZR: CPT

## 2020-03-09 RX ORDER — MORPHINE SULFATE 2 MG/ML
2 INJECTION, SOLUTION INTRAMUSCULAR; INTRAVENOUS
Status: DISCONTINUED | OUTPATIENT
Start: 2020-03-09 | End: 2020-03-10 | Stop reason: HOSPADM

## 2020-03-09 RX ORDER — PENICILLIN G 3000000 [IU]/50ML
INJECTION, SOLUTION INTRAVENOUS
Status: COMPLETED
Start: 2020-03-09 | End: 2020-03-10

## 2020-03-09 RX ORDER — ACETAMINOPHEN 325 MG/1
650 TABLET ORAL EVERY 4 HOURS PRN
Status: DISCONTINUED | OUTPATIENT
Start: 2020-03-09 | End: 2020-03-10 | Stop reason: HOSPADM

## 2020-03-09 RX ORDER — MISOPROSTOL 200 UG/1
1000 TABLET ORAL PRN
Status: DISCONTINUED | OUTPATIENT
Start: 2020-03-09 | End: 2020-03-10 | Stop reason: HOSPADM

## 2020-03-09 RX ORDER — SODIUM CHLORIDE, SODIUM LACTATE, POTASSIUM CHLORIDE, CALCIUM CHLORIDE 600; 310; 30; 20 MG/100ML; MG/100ML; MG/100ML; MG/100ML
INJECTION, SOLUTION INTRAVENOUS CONTINUOUS
Status: DISCONTINUED | OUTPATIENT
Start: 2020-03-09 | End: 2020-03-10

## 2020-03-09 RX ORDER — BUTORPHANOL TARTRATE 1 MG/ML
1 INJECTION, SOLUTION INTRAMUSCULAR; INTRAVENOUS
Status: DISCONTINUED | OUTPATIENT
Start: 2020-03-09 | End: 2020-03-10 | Stop reason: HOSPADM

## 2020-03-09 RX ORDER — IBUPROFEN 800 MG/1
800 TABLET ORAL EVERY 8 HOURS PRN
Status: DISCONTINUED | OUTPATIENT
Start: 2020-03-09 | End: 2020-03-10 | Stop reason: HOSPADM

## 2020-03-09 RX ORDER — TERBUTALINE SULFATE 1 MG/ML
0.25 INJECTION, SOLUTION SUBCUTANEOUS
Status: ACTIVE | OUTPATIENT
Start: 2020-03-09 | End: 2020-03-09

## 2020-03-09 RX ORDER — PENICILLIN G 3000000 [IU]/50ML
3 INJECTION, SOLUTION INTRAVENOUS EVERY 4 HOURS
Status: DISCONTINUED | OUTPATIENT
Start: 2020-03-10 | End: 2020-03-10

## 2020-03-09 RX ORDER — METHYLERGONOVINE MALEATE 0.2 MG/ML
200 INJECTION INTRAVENOUS PRN
Status: DISCONTINUED | OUTPATIENT
Start: 2020-03-09 | End: 2020-03-10 | Stop reason: HOSPADM

## 2020-03-09 RX ORDER — MORPHINE SULFATE 4 MG/ML
4 INJECTION, SOLUTION INTRAMUSCULAR; INTRAVENOUS
Status: DISCONTINUED | OUTPATIENT
Start: 2020-03-09 | End: 2020-03-10 | Stop reason: HOSPADM

## 2020-03-09 RX ORDER — CARBOPROST TROMETHAMINE 250 UG/ML
250 INJECTION, SOLUTION INTRAMUSCULAR PRN
Status: CANCELLED | OUTPATIENT
Start: 2020-03-09

## 2020-03-09 RX ORDER — ONDANSETRON 2 MG/ML
8 INJECTION INTRAMUSCULAR; INTRAVENOUS EVERY 6 HOURS PRN
Status: DISCONTINUED | OUTPATIENT
Start: 2020-03-09 | End: 2020-03-10 | Stop reason: HOSPADM

## 2020-03-09 RX ORDER — LIDOCAINE HYDROCHLORIDE 10 MG/ML
30 INJECTION, SOLUTION EPIDURAL; INFILTRATION; INTRACAUDAL; PERINEURAL PRN
Status: DISCONTINUED | OUTPATIENT
Start: 2020-03-09 | End: 2020-03-10 | Stop reason: HOSPADM

## 2020-03-09 RX ORDER — PENICILLIN G POTASSIUM 5000000 [IU]/1
INJECTION, POWDER, FOR SOLUTION INTRAMUSCULAR; INTRAVENOUS
Status: COMPLETED
Start: 2020-03-09 | End: 2020-03-09

## 2020-03-09 RX ORDER — EPHEDRINE SULFATE 50 MG/ML
5 INJECTION, SOLUTION INTRAVENOUS PRN
Status: DISCONTINUED | OUTPATIENT
Start: 2020-03-09 | End: 2020-03-10

## 2020-03-09 RX ORDER — DIPHENHYDRAMINE HYDROCHLORIDE 50 MG/ML
25 INJECTION INTRAMUSCULAR; INTRAVENOUS EVERY 4 HOURS PRN
Status: DISCONTINUED | OUTPATIENT
Start: 2020-03-09 | End: 2020-03-10 | Stop reason: HOSPADM

## 2020-03-09 RX ORDER — SEVOFLURANE 250 ML/250ML
1 LIQUID RESPIRATORY (INHALATION) CONTINUOUS PRN
Status: DISCONTINUED | OUTPATIENT
Start: 2020-03-09 | End: 2020-03-10 | Stop reason: HOSPADM

## 2020-03-09 RX ADMIN — SODIUM CHLORIDE, POTASSIUM CHLORIDE, SODIUM LACTATE AND CALCIUM CHLORIDE: 600; 310; 30; 20 INJECTION, SOLUTION INTRAVENOUS at 18:10

## 2020-03-09 RX ADMIN — BUTORPHANOL TARTRATE 1 MG: 1 INJECTION, SOLUTION INTRAMUSCULAR; INTRAVENOUS at 21:07

## 2020-03-09 RX ADMIN — PENICILLIN G 3 MILLION UNITS: 3000000 INJECTION, SOLUTION INTRAVENOUS at 23:43

## 2020-03-09 RX ADMIN — ONDANSETRON 8 MG: 2 INJECTION INTRAMUSCULAR; INTRAVENOUS at 22:38

## 2020-03-09 RX ADMIN — PENICILLIN G POTASSIUM 5 MILLION UNITS: 5000000 POWDER, FOR SOLUTION INTRAMUSCULAR; INTRAPLEURAL; INTRATHECAL; INTRAVENOUS at 19:20

## 2020-03-09 RX ADMIN — DEXTROSE MONOHYDRATE 100 ML: 5 INJECTION INTRAVENOUS at 19:20

## 2020-03-09 RX ADMIN — Medication 1 MILLI-UNITS/MIN: at 18:30

## 2020-03-09 ASSESSMENT — PAIN SCALES - GENERAL: PAINLEVEL_OUTOF10: 8

## 2020-03-09 ASSESSMENT — PAIN DESCRIPTION - DESCRIPTORS: DESCRIPTORS: CRAMPING

## 2020-03-09 NOTE — FLOWSHEET NOTE
Pt sitting in bed, states she had some bleeding when wiping, 2 small size dark red blood noted on toilet.  RN reassuring pt and mom that it is likely from MD's vaginal exam.

## 2020-03-10 LAB — RPR: NONREACTIVE

## 2020-03-10 PROCEDURE — 10907ZC DRAINAGE OF AMNIOTIC FLUID, THERAPEUTIC FROM PRODUCTS OF CONCEPTION, VIA NATURAL OR ARTIFICIAL OPENING: ICD-10-PCS | Performed by: INTERNAL MEDICINE

## 2020-03-10 PROCEDURE — 6370000000 HC RX 637 (ALT 250 FOR IP): Performed by: OBSTETRICS & GYNECOLOGY

## 2020-03-10 PROCEDURE — 1220000000 HC SEMI PRIVATE OB R&B

## 2020-03-10 PROCEDURE — 2709999900 HC NON-CHARGEABLE SUPPLY

## 2020-03-10 PROCEDURE — 3700000025 EPIDURAL BLOCK: Performed by: ANESTHESIOLOGY

## 2020-03-10 PROCEDURE — 6360000002 HC RX W HCPCS: Performed by: OBSTETRICS & GYNECOLOGY

## 2020-03-10 PROCEDURE — 2580000003 HC RX 258: Performed by: OBSTETRICS & GYNECOLOGY

## 2020-03-10 PROCEDURE — 7200000001 HC VAGINAL DELIVERY

## 2020-03-10 PROCEDURE — 2500000003 HC RX 250 WO HCPCS: Performed by: ANESTHESIOLOGY

## 2020-03-10 PROCEDURE — 0HQ9XZZ REPAIR PERINEUM SKIN, EXTERNAL APPROACH: ICD-10-PCS | Performed by: INTERNAL MEDICINE

## 2020-03-10 PROCEDURE — 3E033VJ INTRODUCTION OF OTHER HORMONE INTO PERIPHERAL VEIN, PERCUTANEOUS APPROACH: ICD-10-PCS | Performed by: INTERNAL MEDICINE

## 2020-03-10 RX ORDER — SODIUM CHLORIDE 0.9 % (FLUSH) 0.9 %
10 SYRINGE (ML) INJECTION EVERY 12 HOURS SCHEDULED
Status: DISCONTINUED | OUTPATIENT
Start: 2020-03-10 | End: 2020-03-12 | Stop reason: HOSPADM

## 2020-03-10 RX ORDER — IBUPROFEN 800 MG/1
800 TABLET ORAL EVERY 8 HOURS PRN
Status: DISCONTINUED | OUTPATIENT
Start: 2020-03-10 | End: 2020-03-12 | Stop reason: HOSPADM

## 2020-03-10 RX ORDER — LANOLIN 100 %
OINTMENT (GRAM) TOPICAL PRN
Status: DISCONTINUED | OUTPATIENT
Start: 2020-03-10 | End: 2020-03-12 | Stop reason: HOSPADM

## 2020-03-10 RX ORDER — MISOPROSTOL 200 UG/1
800 TABLET ORAL PRN
Status: DISCONTINUED | OUTPATIENT
Start: 2020-03-10 | End: 2020-03-12 | Stop reason: HOSPADM

## 2020-03-10 RX ORDER — SODIUM CHLORIDE, SODIUM LACTATE, POTASSIUM CHLORIDE, CALCIUM CHLORIDE 600; 310; 30; 20 MG/100ML; MG/100ML; MG/100ML; MG/100ML
INJECTION, SOLUTION INTRAVENOUS CONTINUOUS
Status: DISCONTINUED | OUTPATIENT
Start: 2020-03-10 | End: 2020-03-12 | Stop reason: HOSPADM

## 2020-03-10 RX ORDER — ONDANSETRON 4 MG/1
8 TABLET, FILM COATED ORAL EVERY 8 HOURS PRN
Status: DISCONTINUED | OUTPATIENT
Start: 2020-03-10 | End: 2020-03-12 | Stop reason: HOSPADM

## 2020-03-10 RX ORDER — SODIUM CHLORIDE 0.9 % (FLUSH) 0.9 %
10 SYRINGE (ML) INJECTION PRN
Status: DISCONTINUED | OUTPATIENT
Start: 2020-03-10 | End: 2020-03-12 | Stop reason: HOSPADM

## 2020-03-10 RX ORDER — METHYLERGONOVINE MALEATE 0.2 MG/ML
200 INJECTION INTRAVENOUS
Status: ACTIVE | OUTPATIENT
Start: 2020-03-10 | End: 2020-03-10

## 2020-03-10 RX ORDER — DOCUSATE SODIUM 100 MG/1
100 CAPSULE, LIQUID FILLED ORAL 2 TIMES DAILY
Status: DISCONTINUED | OUTPATIENT
Start: 2020-03-10 | End: 2020-03-12 | Stop reason: HOSPADM

## 2020-03-10 RX ORDER — ACETAMINOPHEN 325 MG/1
650 TABLET ORAL EVERY 4 HOURS PRN
Status: DISCONTINUED | OUTPATIENT
Start: 2020-03-10 | End: 2020-03-12 | Stop reason: HOSPADM

## 2020-03-10 RX ORDER — LABETALOL 200 MG/1
200 TABLET, FILM COATED ORAL EVERY 8 HOURS SCHEDULED
Status: DISCONTINUED | OUTPATIENT
Start: 2020-03-10 | End: 2020-03-12 | Stop reason: HOSPADM

## 2020-03-10 RX ADMIN — IBUPROFEN 800 MG: 800 TABLET, FILM COATED ORAL at 06:58

## 2020-03-10 RX ADMIN — DOCUSATE SODIUM 100 MG: 100 CAPSULE, LIQUID FILLED ORAL at 21:34

## 2020-03-10 RX ADMIN — SODIUM CHLORIDE, POTASSIUM CHLORIDE, SODIUM LACTATE AND CALCIUM CHLORIDE: 600; 310; 30; 20 INJECTION, SOLUTION INTRAVENOUS at 07:30

## 2020-03-10 RX ADMIN — PENICILLIN G 3 MILLION UNITS: 3000000 INJECTION, SOLUTION INTRAVENOUS at 04:22

## 2020-03-10 RX ADMIN — Medication 18 ML/HR: at 00:30

## 2020-03-10 ASSESSMENT — PAIN SCALES - GENERAL: PAINLEVEL_OUTOF10: 0

## 2020-03-10 NOTE — FLOWSHEET NOTE
Patient wanting to try epidural placement again, denies wanting the stadol again or to try the nitrous, Dr. Ary Wyman notified.

## 2020-03-10 NOTE — L&D DELIVERY NOTE
Ayush Rojas, DALIA Respiratory Therapist (Night)    Linda Dean DO Anesthesiologist      Cord    Vessels:  3 Vessels  Delayed cord clamping?:  Yes     Placenta    Date/time:  3/10/2020 06:01:00  Removal:  Spontaneous  Appearance:  Intact  Disposition:  Refrigerator     Delivery Resuscitation    Method:  Stimulation, Bulb Suction     Apgars    Apgars   1 Minute:   5 Minute:   10 Minute 15 Minute 20 Minute   Skin Color: 0  1       Heart Rate: 2  2       Reflex Irritability: 2  2       Muscle Tone: 2  2       Respiratory Effort: 2  2       Total: 8  9               Apgars Assigned By:  Corey Garcia RN     Skin to Skin    Skin to skin initiation date/time: 3/10/20 05:58:00   Skin to skin with: Mother  Skin to skin end date/time:         Measurements       Delivery Information    Episiotomy:  None  Perineal lacerations:  1st    Vaginal laceration:  No    Cervical laceration:  No    Surgical or additional est. blood loss (mL):  0 (View Only):  Edit in Flowsheets   Combined est. blood loss (mL):  0     Vaginal Delivery Counts    Initial count personnel:  Morena Ortiz RN  Initial count verified by:  10 SPONGES,1 NEEDLE,16 INST. 4x4:   Needles:   Instruments:   Lap Pads:   Sponges:     Initial counts:          Final counts:          Final count personnel:  DR Everette Mojica  Final count verified by:  16 INSTRUMENTS,1+1 NEEDLE ADDED=2,10 SPONGES  Accurate final count?:  Yes  Final vaginal sweep completed:   Yes

## 2020-03-10 NOTE — FLOWSHEET NOTE
Pericare given. Pt denies need to void. Pt to w/c with 1 assist. Transferred to m/b with infant in arms. Report given to MELINDA Cuellar RN at bedside.

## 2020-03-10 NOTE — H&P
Obstetric Admission Note        CHIEF COMPLAINT: for delivery    HISTORY OF PRESENT ILLNESS:                     The patient is a 20 y.o.  female @ 39+1 weeks with significant past medical history of morbid obesity and depression who presents for an induction due to pre-eclampsia. Her BP in the office today was 160/100.  + HA. Denies visual changes. Good FM.  +cramping. Past Medical History:        Diagnosis Date    Anemia [id number]    during pregnancy, taking extra iron    Depression     no meds    Hypertension     couple weeks 2020    Psoriasis     age 12       Medications Prior to Admission:   Medications Prior to Admission: ferrous sulfate 325 (65 Fe) MG tablet, Take 325 mg by mouth daily (with breakfast)  Prenatal MV-Min-Fe Fum-FA-DHA (PRENATAL 1 PO), Take 1 tablet by mouth daily    Allergies:  Patient has no known allergies. DATA:    Cervical exam /-2  Membranes ruptured at   FHT's 140 reactive  [place] q3-7min    ASSESSMENT AND PLAN:      Assessment problems  IUP @ 39+1wks  Pre-eclampsia  Morbid obesity BMI 54  Depression    Plan:  - Admit to L&D  - Anticipate Vaginal Delivery  Samuel GUILLEN

## 2020-03-10 NOTE — FLOWSHEET NOTE
Spoke with Dr. Curt Torrez on the phone and updated on VE, failed epidural attempt d/t patient tolerance, and FHTs. IUPC placed and pitocin in use. Will continue with current POC.

## 2020-03-10 NOTE — FLOWSHEET NOTE
Patient up to bathroom for first time. Successful void. Small amount of lochia noted on pad. Patient back to bed. Fundus midline firm at U/-1. Due to void x1.

## 2020-03-10 NOTE — FLOWSHEET NOTE
Patient arrived to 5A16 via wheelchair with  is arms. Report received from Tonya Razo RN. Patient oriented to room call light, hourly rounding, pain goal, orange book, and vaccination. Patient due to void x2.

## 2020-03-11 PROCEDURE — 2709999900 HC NON-CHARGEABLE SUPPLY

## 2020-03-11 PROCEDURE — 1220000000 HC SEMI PRIVATE OB R&B

## 2020-03-11 PROCEDURE — 6370000000 HC RX 637 (ALT 250 FOR IP): Performed by: OBSTETRICS & GYNECOLOGY

## 2020-03-11 RX ADMIN — ACETAMINOPHEN 650 MG: 325 TABLET ORAL at 12:08

## 2020-03-11 RX ADMIN — IBUPROFEN 800 MG: 800 TABLET, FILM COATED ORAL at 10:20

## 2020-03-11 RX ADMIN — LABETALOL HYDROCHLORIDE 200 MG: 200 TABLET, FILM COATED ORAL at 21:05

## 2020-03-11 RX ADMIN — IBUPROFEN 800 MG: 800 TABLET, FILM COATED ORAL at 02:14

## 2020-03-11 RX ADMIN — DOCUSATE SODIUM 100 MG: 100 CAPSULE, LIQUID FILLED ORAL at 10:20

## 2020-03-11 ASSESSMENT — PAIN DESCRIPTION - PROGRESSION
CLINICAL_PROGRESSION: NOT CHANGED
CLINICAL_PROGRESSION: GRADUALLY WORSENING
CLINICAL_PROGRESSION: GRADUALLY IMPROVING

## 2020-03-11 ASSESSMENT — PAIN DESCRIPTION - FREQUENCY
FREQUENCY: INTERMITTENT
FREQUENCY: INTERMITTENT

## 2020-03-11 ASSESSMENT — PAIN SCALES - GENERAL
PAINLEVEL_OUTOF10: 1
PAINLEVEL_OUTOF10: 4
PAINLEVEL_OUTOF10: 0
PAINLEVEL_OUTOF10: 1
PAINLEVEL_OUTOF10: 2
PAINLEVEL_OUTOF10: 2
PAINLEVEL_OUTOF10: 0

## 2020-03-11 ASSESSMENT — PAIN DESCRIPTION - DESCRIPTORS
DESCRIPTORS: CRAMPING
DESCRIPTORS: CRAMPING

## 2020-03-11 ASSESSMENT — PAIN - FUNCTIONAL ASSESSMENT: PAIN_FUNCTIONAL_ASSESSMENT: ACTIVITIES ARE NOT PREVENTED

## 2020-03-11 ASSESSMENT — PAIN DESCRIPTION - LOCATION
LOCATION: ABDOMEN
LOCATION: ABDOMEN

## 2020-03-11 ASSESSMENT — PAIN DESCRIPTION - PAIN TYPE
TYPE: ACUTE PAIN
TYPE: ACUTE PAIN

## 2020-03-11 NOTE — PLAN OF CARE
Problem: Anxiety:  Goal: Level of anxiety will decrease  Description: Level of anxiety will decrease  Note: Patient appears to be mildly anxious, all plans and procedures explained and questions answered. Problem: Breathing Pattern - Ineffective:  Goal: Able to breathe comfortably  Description: Able to breathe comfortably  Note: Patient is resting in bed with even and easy respirations at this time. Problem: Fluid Volume - Imbalance:  Goal: Absence of intrapartum hemorrhage signs and symptoms  Description: Absence of intrapartum hemorrhage signs and symptoms  Note: Patient is not showing and signs of bleeding at this time. Will continue to monitor. Problem: Infection - Intrapartum Infection:  Goal: Will show no infection signs and symptoms  Description: Will show no infection signs and symptoms  Note: Patient is afebrile at this time and BOW is intact. Problem: Labor Process - Prolonged:  Goal: Uterine contractions within specified parameters  Description: Uterine contractions within specified parameters  Note: TOCO in place with regular contractions noted. Problem: Pain - Acute:  Goal: Able to cope with pain  Description: Able to cope with pain  Note: Patient is planning on a labor epidural when she needs it for pain relief. Problem: Tissue Perfusion - Uteroplacental, Altered:  Goal: Absence of abnormal fetal heart rate pattern  Description: Absence of abnormal fetal heart rate pattern  Note: EFM in place with reactive tracing noted. Problem: Urinary Retention:  Goal: Urinary elimination within specified parameters  Description: Urinary elimination within specified parameters  Note: Patient is voidingin RR as needed at this time. Problem: Falls - Risk of:  Goal: Absence of physical injury  Description: Absence of physical injury  Note: Patient is alert and oriented, call light in reach and patient uses appropriately.       Problem: Discharge Planning:  Goal: Discharged to
Problem: Discharge Planning:  Goal: Discharged to appropriate level of care  Description: Discharged to appropriate level of care  Outcome: Ongoing  Note: Plan to be discharged home with significant other. Problem: Constipation:  Goal: Bowel elimination is within specified parameters  Description: Bowel elimination is within specified parameters  Outcome: Ongoing  Note: Patient not passing gas. Stool softener offered patient refused at this time. Problem: Fluid Volume - Imbalance:  Goal: Absence of imbalanced fluid volume signs and symptoms  Description: Absence of imbalanced fluid volume signs and symptoms  Outcome: Ongoing  Note: Vaginal bleeding WNL, no clots or foul odors. Problem: Infection - Risk of, Puerperal Infection:  Goal: Will show no infection signs and symptoms  Description: Will show no infection signs and symptoms  Outcome: Ongoing  Note: Afebrile this shift. Problem: Mood - Altered:  Goal: Mood stable  Description: Mood stable  Outcome: Ongoing  Note: Emotional support provided. Problem: Pain - Acute:  Goal: Pain level will decrease  Description: Pain level will decrease  Outcome: Ongoing  Note: Pain goal noted to be a 3. Denies pain at this time. Motrin given with relief. Care plan reviewed with patient and she contributes to goal setting and voices understanding of plan of care.
decrease  Description: Pain level will decrease  3/11/2020 1323 by Feliciano Van RN  Outcome: Ongoing  Note: Pain assessed every 4 hours with head to toe and as needed. PRN motrin and tylenol given as needed. Pt instructed on use of sprays, tucks, ice and heat. Pain goal discussed as 3. Patient is responding well to main management. Care plan reviewed with patient. Patient verbalizes understanding of the plan of care and contributes to goal setting.

## 2020-03-11 NOTE — FLOWSHEET NOTE
Patient awake in bed. +1 edema on LLE, +2 pitting edema on RLE. Pt states she sprained right ankle 1 week ago. Capillary refill less than 3 seconds. All pulses palpable. Negative Homans sign. Regular heart rate at 81 bpm. Uterine fundus firm, deviated right of midline due to full bladder. Moderate amount of lochia rubra, no clots visualized. Mid back slightly bruised from epidural. Perineum slightly edematous. Pt given ice pack to reduce swelling. Pt able to ambulate to bathroom and in room. Pain 2 on scale of 1-10. Given Motrin and Colace. Good bonding noted, mom attentive to babies needs.

## 2020-03-12 VITALS
BODY MASS INDEX: 45.99 KG/M2 | OXYGEN SATURATION: 100 % | HEIGHT: 67 IN | SYSTOLIC BLOOD PRESSURE: 129 MMHG | HEART RATE: 90 BPM | DIASTOLIC BLOOD PRESSURE: 82 MMHG | TEMPERATURE: 98.7 F | RESPIRATION RATE: 17 BRPM | WEIGHT: 293 LBS

## 2020-03-12 PROCEDURE — 6370000000 HC RX 637 (ALT 250 FOR IP): Performed by: OBSTETRICS & GYNECOLOGY

## 2020-03-12 PROCEDURE — 2709999900 HC NON-CHARGEABLE SUPPLY

## 2020-03-12 RX ADMIN — ACETAMINOPHEN 650 MG: 325 TABLET ORAL at 00:40

## 2020-03-12 ASSESSMENT — PAIN SCALES - GENERAL
PAINLEVEL_OUTOF10: 0
PAINLEVEL_OUTOF10: 0
PAINLEVEL_OUTOF10: 2

## 2020-03-12 NOTE — DISCHARGE SUMMARY
Obstetric Discharge Summary      Pt Name: Debra Rinne  MRN: [id number] [place] #: [de-identified]  YOB: 2000        Admitting Diagnosis  IUP  OB History        2    Para   1    Term   1       0    AB   1    Living   1       SAB   1    TAB   0    Ectopic   0    Molar   0    Multiple   0    Live Births   1                Reasons for Admission on 3/9/2020  5:45 PM  Spontaneous vaginal delivery [O80]        Postpartum/Operative Complications       [place] Data  Information for the patient's :  Mili Lees Girl [facility] [[id number]]   female  Birth Weight: 7 lb 7.2 oz (3.38 kg)      Discharge Diagnosis  Postpartum, Vaginal Delivery    Discharge Information  Current Discharge Medication List      CONTINUE these medications which have NOT CHANGED    Details   ferrous sulfate 325 (65 Fe) MG tablet Take 325 mg by mouth daily (with breakfast)      Prenatal MV-Min-Fe Fum-FA-DHA (PRENATAL 1 PO) Take 1 tablet by mouth daily                 Vaginal Delivery  Diet regular    Condition: Stable  Discharge to:  home  Follow up in 4 weeks    Patient to be discharged on 3/12/20  Electronically signed by PALAK Miller CNP on 3/12/2020 at 10:57 AM

## 2020-03-12 NOTE — FLOWSHEET NOTE
Post birth warning signs education paper given and reviewed, teaching complete. [place] postpartum depression screening discussed with patient, instructed to contact her healthcare provider if her score is > 10. Patient voiced understanding. Mother's blood type is O+.

## 2020-03-13 NOTE — PROGRESS NOTES
Department of Obstetrics and Gynecology  Labor and Delivery  Post Partum Day #1      SUBJECTIVE:  Patient feeling well with no complaints. Breastfeeding bottlefeeding without difficulty. Mild lochia. Patient denies pain. Urination without difficulty. OBJECTIVE:/82   Pulse 90   Temp 98.7 °F (37.1 °C) (Oral)   Resp 17   Ht 5' 7\" (1.702 m)   Wt (!) 346 lb (156.9 kg)   SpO2 100%   Breastfeeding Unknown   BMI 54.19 kg/m²    ABDOMEN:  Soft, non-tender, fundus firm. Extremities: warm and dry. tr edema    DATA:    Lab Results   Component Value Date    HGB 11.0 2020    HCT 34.0 2020       ASSESSMENT & PLAN:    PPD #1 s/p .         Routine PP care       D/C home tomorrow       F/U 4 weeks       Scooter Lock

## 2020-03-23 ASSESSMENT — ENCOUNTER SYMPTOMS
VOMITING: 1
SINUS PRESSURE: 0
NAUSEA: 1
CHEST TIGHTNESS: 0
SHORTNESS OF BREATH: 0
SORE THROAT: 0
TROUBLE SWALLOWING: 0
COLOR CHANGE: 0
WHEEZING: 0
COUGH: 0
CONSTIPATION: 0
SINUS PAIN: 0
ABDOMINAL PAIN: 0
DIARRHEA: 0
RHINORRHEA: 0
PHOTOPHOBIA: 0
BACK PAIN: 0

## 2020-04-12 NOTE — PROCEDURES
Department of Obstetrics and Gynecology  Labor and Delivery   Triage Note        Pt Name: Fadi Ojeda  MRN: [id number] [place] #: [de-identified]  YOB: 2000      SUBJECTIVE: This patient presented at 45 weeks gestation complaining of dizziness    OBJECTIVE    Vitals:  BP (!) 145/83   Pulse 78   Temp 97.1 °F (36.2 °C) (Oral)   Resp 18   Ht 5' 7\" (1.702 m)   Wt (!) 345 lb (156.5 kg)   SpO2 98%   BMI 54.03 kg/m²     Fetal heart rate:         Baseline Heart Rate:  130        Accelerations:  present       Decelerations:  none       Variability:  moderate    Contraction frequency: none    The NST was reactive        ASSESSMENT & PLAN:  Discharged to home in a stable condition and instructed to follow up at her next scheduled appointment. [address] [facility] [address].

## 2021-09-03 ENCOUNTER — OFFICE VISIT (OUTPATIENT)
Dept: BARIATRICS/WEIGHT MGMT | Age: 21
End: 2021-09-03
Payer: MEDICARE

## 2021-09-03 VITALS
WEIGHT: 293 LBS | HEART RATE: 88 BPM | TEMPERATURE: 97.2 F | SYSTOLIC BLOOD PRESSURE: 126 MMHG | RESPIRATION RATE: 18 BRPM | BODY MASS INDEX: 44.41 KG/M2 | HEIGHT: 68 IN | DIASTOLIC BLOOD PRESSURE: 88 MMHG

## 2021-09-03 DIAGNOSIS — F41.9 ANXIETY: ICD-10-CM

## 2021-09-03 DIAGNOSIS — E66.01 MORBID OBESITY WITH BMI OF 50.0-59.9, ADULT (HCC): Primary | ICD-10-CM

## 2021-09-03 DIAGNOSIS — F32.A DEPRESSION, UNSPECIFIED DEPRESSION TYPE: ICD-10-CM

## 2021-09-03 PROCEDURE — 99203 OFFICE O/P NEW LOW 30 MIN: CPT | Performed by: SURGERY

## 2021-09-03 PROCEDURE — G8427 DOCREV CUR MEDS BY ELIG CLIN: HCPCS | Performed by: SURGERY

## 2021-09-03 PROCEDURE — G8417 CALC BMI ABV UP PARAM F/U: HCPCS | Performed by: SURGERY

## 2021-09-03 PROCEDURE — 4004F PT TOBACCO SCREEN RCVD TLK: CPT | Performed by: SURGERY

## 2021-09-04 ASSESSMENT — ENCOUNTER SYMPTOMS
WHEEZING: 0
SHORTNESS OF BREATH: 0
PHOTOPHOBIA: 0
EYE PAIN: 0
VOICE CHANGE: 0
COUGH: 0
TROUBLE SWALLOWING: 0
CONSTIPATION: 0
FACIAL SWELLING: 0
ALLERGIC/IMMUNOLOGIC NEGATIVE: 1
VOMITING: 0
RHINORRHEA: 0
APNEA: 0
COLOR CHANGE: 0
EYE REDNESS: 0
ABDOMINAL PAIN: 0
EYE DISCHARGE: 0
CHEST TIGHTNESS: 0
BLOOD IN STOOL: 0
CHOKING: 0
STRIDOR: 0
RECTAL PAIN: 0
NAUSEA: 0
SORE THROAT: 0
ANAL BLEEDING: 0
DIARRHEA: 0
BACK PAIN: 0
SINUS PRESSURE: 0
EYE ITCHING: 0
ABDOMINAL DISTENTION: 0

## 2021-09-04 NOTE — PROGRESS NOTES
Laci Oliver (:  2000)     ASSESSMENT:  1.  Morbid obesity (BMI 52)  2. Anxiety  3. Depression    PLAN:  1. Long discussion about the pros and cons of weight loss surgery. The risks benefits and alternatives to laparoscopic adjustable band, gastric sleeve and gastric Joni-en-Y bypass were discussed in detail. The pros and cons of robotic assisted, laparoscopic and open techniques were discussed. 2.  Behavior modification discussed in detail in regards to dietary habits. 3.  Nutritional education occurred during visit. Will set up a consultation/evaluation with dietitian for further evaluation. 4.  Options for medical management of morbid obesity discussed. 5.  Improvement in fitness/exercise discussed with patient and the need for this with/without surgery. 6.  Obtain medical necessity letter from PCP as needed. 7.  Follow-up in one month at weight management program at [facility]. 8.  Signs and symptoms reviewed with patient that would be concerning and need her to return to office for re-evaluation. Patient states she will call if she has questions or concerns. 9. Multivitamin  10. Psychology evaluation  11. EGD prior to any surgical intervention  12. Encouraged support groups  13. Encouraged Naturally Slim/Rev It Up  14. Discussed the pros and cons along with possible side effects/complications of weight loss medications. All questions answered. Patient states that if she is not able to lose enough adequate excess body weight with medical management only then she would be like to proceed with surgical intervention such as sleeve gastrectomy/gastric bypass for further weight loss. More than 40 minutes spent with patient today. Greater than 50% of the time was involved counseling, educaton and coordinating care.     SUBJECTIVE/OBJECTIVE:    Chief Complaint   Patient presents with    Bariatric, Initial Visit     New Patient 6 month requirement      [facility] chest pain, palpitations and leg swelling. Gastrointestinal: Negative for abdominal distention, abdominal pain, anal bleeding, blood in stool, constipation, diarrhea, nausea, rectal pain and vomiting. Endocrine: Negative. Genitourinary: Negative for decreased urine volume, difficulty urinating, dyspareunia, dysuria, enuresis, flank pain, frequency, genital sores, hematuria, menstrual problem, pelvic pain, urgency, vaginal bleeding, vaginal discharge and vaginal pain. Musculoskeletal: Negative for arthralgias, back pain, gait problem, joint swelling, myalgias, neck pain and neck stiffness. Skin: Negative for color change, pallor, rash and wound. Allergic/Immunologic: Negative. Neurological: Negative for dizziness, tremors, seizures, syncope, facial asymmetry, speech difficulty, weakness, light-headedness, numbness and headaches. Hematological: Negative for adenopathy. Does not bruise/bleed easily. Psychiatric/Behavioral: Negative for agitation, behavioral problems, confusion, decreased concentration, dysphoric mood, hallucinations, self-injury, sleep disturbance and suicidal ideas. The patient is not nervous/anxious and is not hyperactive. Past Medical History:   Diagnosis Date    Anemia 8929-3210    during pregnancy, taking extra iron    Depression     no meds    Hypertension     couple weeks 2/28/2020    Psoriasis     age 12       Past Surgical History:   Procedure Laterality Date    ABDOMINAL HERNIA REPAIR  18 months    TONSILLECTOMY      age 9    TONSILLECTOMY AND ADENOIDECTOMY  age 10       No current outpatient medications on file. No current facility-administered medications for this visit. No Known Allergies    History reviewed. No pertinent family history.     Social History     Socioeconomic History    Marital status: Single     Spouse name: Not on file    Number of children: Not on file    Years of education: Not on file    Highest education level: Not on file Occupational History    Not on file   Tobacco Use    Smoking status: Current Every Day Smoker     Packs/day: 1.00     Start date: 7/18/2019    Smokeless tobacco: Never Used   Vaping Use    Vaping Use: Never used   Substance and Sexual Activity    Alcohol use: Yes     Comment: social     Drug use: Yes     Comment: \"smoked pot at a party couple weeks ago\"    Sexual activity: Yes     Partners: Male   Other Topics Concern    Not on file   Social History Narrative    Not on file     Social Determinants of Health     Financial Resource Strain:     Difficulty of Paying Living Expenses:    Food Insecurity:     Worried About Running Out of Food in the Last Year:     Ran Out of Food in the Last Year:    Transportation Needs:     Lack of Transportation (Medical):  Lack of Transportation (Non-Medical):    Physical Activity:     Days of Exercise per Week:     Minutes of Exercise per Session:    Stress:     Feeling of Stress :    Social Connections:     Frequency of Communication with Friends and Family:     Frequency of Social Gatherings with Friends and Family:     Attends Caodaism Services:     Active Member of Clubs or Organizations:     Attends Club or Organization Meetings:     Marital Status:    Intimate Partner Violence:     Fear of Current or Ex-Partner:     Emotionally Abused:     Physically Abused:     Sexually Abused:      Vitals:    09/03/21 0904   BP: 126/88   Site: Right Upper Arm   Position: Sitting   Cuff Size: Large Adult   Pulse: 88   Resp: 18   Temp: 97.2 °F (36.2 °C)   TempSrc: Infrared   Weight: (!) 340 lb 12.8 oz (154.6 kg)   Height: 5' 7.5\" (1.715 m)     Body mass index is 52.59 kg/m². Wt Readings from Last 3 Encounters:   09/03/21 (!) 340 lb 12.8 oz (154.6 kg)   03/09/20 (!) 346 lb (156.9 kg)   03/05/20 245 lb (111.1 kg)     Physical Exam  Vitals reviewed. Constitutional:       General: She is not in acute distress. Appearance: She is well-developed.  She is not diaphoretic. HENT:      Head: Normocephalic and atraumatic. Right Ear: External ear normal.      Left Ear: External ear normal.      Nose: Nose normal.   Eyes:      General: No scleral icterus. Right eye: No discharge. Left eye: No discharge. Conjunctiva/sclera: Conjunctivae normal.   Cardiovascular:      Rate and Rhythm: Normal rate and regular rhythm. Pulmonary:      Effort: Pulmonary effort is normal. No respiratory distress. Breath sounds: Normal breath sounds. No wheezing or rales. Chest:      Chest wall: No tenderness. Abdominal:      General: Bowel sounds are normal. There is no distension. Palpations: Abdomen is soft. There is no mass. Tenderness: There is no abdominal tenderness. There is no guarding or rebound. Musculoskeletal:         General: No tenderness. Normal range of motion. Cervical back: Normal range of motion and neck supple. Skin:     General: Skin is warm and dry. Coloration: Skin is not pale. Findings: No erythema or rash. Neurological:      Mental Status: She is alert and oriented to person, place, and time. Cranial Nerves: No cranial nerve deficit. Psychiatric:         Behavior: Behavior normal.         Thought Content:  Thought content normal.         Judgment: Judgment normal.       CBC   Lab Results   Component Value Date    WBC 13.4 03/09/2020    RBC 3.87 03/09/2020    HGB 11.0 03/09/2020    HCT 34.0 03/09/2020    MCV 87.9 03/09/2020    MCH 28.4 03/09/2020    MCHC 32.4 03/09/2020     03/09/2020    MPV 9.5 03/09/2020    SEGSPCT 77.8 03/09/2020    LABLYMP 11.0 03/09/2020    MONOPCT 9.1 03/09/2020    LABEOS 1.3 03/09/2020    BASO 0.1 03/09/2020    NRBC 0 03/09/2020    SEGSABS 10.4 03/09/2020    LYMPHSABS 1.5 03/09/2020    MONOSABS 1.2 03/09/2020    EOSABS 0.2 03/09/2020    BASOSABS 0.0 03/09/2020      BMP/CMP   Lab Results   Component Value Date    GLUCOSE 83 03/05/2020    CREATININE 0.3 03/05/2020    BUN 9 03/05/2020     03/05/2020    K 4.9 03/05/2020     03/05/2020    CO2 21 03/05/2020    CALCIUM 9.6 03/05/2020    AST 12 03/09/2020    ALKPHOS 113 03/09/2020    PROT 6.4 03/09/2020    LABALBU 3.6 03/09/2020    BILITOT 0.4 03/09/2020    ALT 8 03/09/2020      PREALBUMIN   No results found for: PREALBUMIN   VITAMIN B12   No results found for: RKEZMAMQ32   VITAMIN D   No results found for: VITD25   PTH  No results found for: IPTH  VITAMIN B1/ THIAMINE   No results found for: ILVZ7QPIOCU   LIPID SCREEN (FASTING)   No results found for: CHOL, TRIG, HDL, LDLCALC,   HGA1C (T2DM ONLY)   No results found for: LABA1C, AVGG   TSH   No results found for: TSH   IRON   No results found for: IRON   TIBC  No results found for: TIBC  FERRITIN  No results found for: FERRITIN  VITAMIN A  No results found for: RETINOL  NICOTINE  No results found for: NMET  UDS  No results found for: UDP  PSA  No results found for: LABPSA  GFR  Lab Results   Component Value Date    LABGLOM >90 03/05/2020     DEXA  No results found for this or any previous visit. Patient Active Problem List   Diagnosis    Abdominal pain    Abdominal pain affecting pregnancy    Pregnancy with complication    Fall against object    Dizzy    Spontaneous vaginal delivery       An electronic signature was used to authenticate this note.     --Elvia Frost MD

## 2021-09-20 NOTE — PROGRESS NOTES
Patient is a 24 y.o. female seen for   initial  MNT visit for  pre op bariatric surgery desires sleeve    BMI: Body mass index is 53.05 kg/m². Obesity Classification: Class III    Weight History: Wt Readings from Last 3 Encounters:   09/21/21 (!) 343 lb 12.8 oz (155.9 kg)   09/03/21 (!) 340 lb 12.8 oz (154.6 kg)   03/09/20 (!) 346 lb (156.9 kg)     (+)smoker- states quit in last two weeks on own. Sabrina Hartman still smokes    Patient is taking a Multivitamin daily:  Does not take a MVI    Describe your current weight: \"I wake up in morning and don't want to get out bed\"- sluggish, back problems from weight, SOB. How does your weight affect your daily activities? concern over medical problems, lack of energy . Patient's lowest adult weight was 215 lbs at age 24- right out of high school. The lowest weight was achieved through younger age, more active. .    Pre pregnancy weight was ~ 250 lbs age 20. Weight post pregnancy was ~ 315 lbs and has continued to gain weight after pregnancy  Patient's highest adult weight was 343 lbs at age 24. Patient was at her highest weight for 6 months. Patient has participated in the following weight loss programs: Slim Fast, and Herbalife, Atkins, Keto. Patient has participated in meal replacement/liquid diets. Patient has not participated in weight loss medications. Patient is not lactose intolerant. Patient does not have Tenriism/cultural food concerns. Patient does not have food allergies. Patient dines out to a sit down restaurant 1 time  per week. Patient has fast food or picks up carry out 3-4 times per week. Grocery Shopping in household done by: [employer] in household done by: pt cooks     Pt does not work. Pt gets to bed around 11pm and wakes up 8-9am    24 hour recall/food frequency chart:  Breakfast: no.  Snack: no.   Lunch: yes.  1pm- today had [employer] for lunch, peanut butter sandwich or ham sandwich and pear cups or pork rhinds  Snack: no.   Dinner: yes. ~ 7p-10p- Sometimes cooks dinner- had homemade pizza last nite, or baked chicken or chili with hamburger. Likes corn. Little vegetable intake  Snack: no.   Drinks throughout the day: water ~ 16 oz glass fills it three times a day, no pop- occasional Sprite at Sit down restaurant, Powerade Zero, no coffee or tea  Do you drink alcohol? No.- states used to be more of a social drinker - hasn't drank in three months    Patient does not meet the criteria for binge eating disorder. Patient does not have grazing. Patient does not have night eating. Patient does have a history of emotional eating or eating out of boredom. - will eat large portion of food when stressed    It does take an unusually large amount of food for the patient to feel comfortably full. Patient describes self as slow eater      Patient describes level of activity as sedentary. Willing to start walking to park more often with daughter. Patient will plan to attend Fitness Orientation on: - to schedule with Suzy HOWARD    Assessment  Nutritional Needs: Toy Huseyin = 2347 kcal x 1.2 (activity factor)= 2816 kcal - 500-1000 calories/day  (for 1-2 lb weight loss/week)= 0458-8697 calories per day  Food recall reveals often skipping breakfast. Pt shows interest in eating out less and cooking more meals at home  PES Statement:  Overweight/Obesity related to lack of exercise, sedentary lifestyle, unhealthy eating habits, and unsuccessful diet attempts as evidenced by  Body mass index is 53.05 kg/m². .    Surgery  Surgical procedure desired: gastric sleeve  Patient's greatest concern about having surgery is: denies concerns. Patient describes the motivation for weight loss surgery to be: \"For my daughter\". The expectations following the surgery were reviewed in detail with patient today and patient made aware will require to eliminate carbonated beverages, aim for regular meal times, monitor portion sizes, and balanced meals. Bk Iqbal she does feel she can deal with the dietary restrictions. Patient states she does understand the consequences of not complying with post-op food guidelines. Patient states she understands the long term changes in food intake that will be necessary for all occasions after surgery for the rest of her life. Patient is deemed nutritionally appropriate to proceed if able to show progress towards nutrition behavior changes discussed today. Plan  Patient will begin working on recommendations from Pre-Weight Loss Surgery Behavior Change Goal Sheet that was reviewed today to assist with weight loss and establish a  long term healthy eating pattern. Individual goals set with patient to be reviewed at monthly office visits. Weight loss goal of ~5% from initial weight at first visit with Dr Branden Mariano reviewed with patient to achieve over 6 month period per insurance requirements. Initial weight was: 340 lbs   -5% Weight Loss goal will be minimum of: 17 lbs weight loss. Plan/Recommendations:   Educational handouts provided and reviewed with patient as follows: Online Support Groups, My Plate Picture Method, Portion Size Guide, Food Journal    -  Patient Instructions   Goals:  1. [facility]  2. I will get the lab work done that is mailed to my mailing address before next office visit. You will need to fast 10-12 hours for this (no food or drink besides water). 3.  I will aim for at least 64 oz of water each day (= 8 cups per day or four bottled brennan)  4. I will use my food journal to record meal times, serving sizes and bring back to next dietitian visit. 5.  I will increase my physical activity by taking walks to park 2-3 days a week for at least 15 minutes. 6.  Initial weight loss goal of -5% is recommended over 6 month period. This is a minimum of: 17 lbs from your weight when initially met with physician of: 340 lbs. Www.choosemyplate.gov - good information on nutrition and check out \"My Plate Kitchen\" for recipes      -Followup visit: 4 weeks with dietitian.        Raya Varela RD, LD RD, LD  Dietitian- Weight Management [address] [place] [address]

## 2021-09-21 ENCOUNTER — OFFICE VISIT (OUTPATIENT)
Dept: BARIATRICS/WEIGHT MGMT | Age: 21
End: 2021-09-21

## 2021-09-21 VITALS — HEIGHT: 68 IN | WEIGHT: 293 LBS | BODY MASS INDEX: 44.41 KG/M2

## 2021-09-21 DIAGNOSIS — E66.01 MORBID OBESITY WITH BMI OF 50.0-59.9, ADULT (HCC): Primary | ICD-10-CM

## 2021-09-21 NOTE — PATIENT INSTRUCTIONS
Goals: 1. I will attend Fitness Orientation by contacting [id number]  2. I will get the lab work done that is mailed to my mailing address before next office visit. You will need to fast 10-12 hours for this (no food or drink besides water). 3.  I will aim for at least 64 oz of water each day (= 8 cups per day or four bottled brennan)  4. I will use my food journal to record meal times, serving sizes and bring back to next dietitian visit. 5.  I will increase my physical activity by taking walks to park 2-3 days a week for at least 15 minutes. 6.  Initial weight loss goal of -5% is recommended over 6 month period. This is a minimum of: 17 lbs from your weight when initially met with physician of: 340 lbs.     Www.choosemyplate.gov - good information on nutrition and check out \"My Plate Kitchen\" for recipes

## 2021-09-22 DIAGNOSIS — E66.01 MORBID OBESITY WITH BMI OF 50.0-59.9, ADULT (HCC): Primary | ICD-10-CM

## 2021-09-22 DIAGNOSIS — Z13.21 MALNUTRITION SCREEN: ICD-10-CM

## 2021-09-22 DIAGNOSIS — Z01.818 PRE-OP TESTING: ICD-10-CM

## 2021-10-20 ENCOUNTER — TELEPHONE (OUTPATIENT)
Dept: BARIATRICS/WEIGHT MGMT | Age: 21
End: 2021-10-20

## 2021-11-08 NOTE — PROGRESS NOTES
Assessment: Patient is a 24 y.o. female seen for   month one  follow up MNT visit for  pre op bariatric surgery desires sleeve    Vitals from current and previous visits:  Vitals 83/55/7378   SYSTOLIC 999   DIASTOLIC 72   Site Right Upper Arm   Position Sitting   Cuff Size Large Adult   Pulse 88   Temp 97.9   Resp    SpO2    Weight 345 lb   Height 5' 7.5\"   Body mass index 53.23 kg/m2   Pain Level    Waist (Inches)    Neck circumference        Initial weight at start of Weight Management Program was: 340 lbs     [facility] gained 5 lbs from initial MD visit  -Weight goal: lose weight.     -Nutritionally relevant labs: Initial labs done  Lab Results   Component Value Date/Time    GLUCOSE 83 03/05/2020 02:25 PM     States hasn't had a cigarette since beginning of October- is around a lot of second hand smoke    - Is patient taking daily Multivitamin:  Does not take a MVI    Pt does not work. Pt gets to bed around 11pm and wakes up 8-9am  Pt started Keto style meal planning at  Home for the last 1 .5 weeks. Pt did not have food journal in office today  -Food Recall: Verbal recall taken  Breakfast: Slimwich bread with almond butter. Lunch: today had a salad with baconator and no bun from [employer]. Dinner: recently making chicken parmesan or tacos with lettuce wrap with vegetables- salads. Snacks: denies snacking with starting Keto Style- prior to this was snacking in evening on chips or cookies . Main Beverages: Water glass 24 oz at least four per day, no pop- has not had any Sprite,  Powerade Zero, no coffee or tea  -Impression of Dietary Intake: following lower carbohydrate food choices. - Pt  is working towards avoiding high fat/high sugar foods. Pt  is working towards  including protein at meals and snacks. Exercise:    -Physical Activity is:  Willing to start walking to park more often with daughter.    Patient will plan to attend Fitness Orientation on: - to schedule with [facility] Nutrition Diagnosis: Overweight/Obesity related to currently undergoing MNT as evidenced by BMI of 53  Intervention:   Healthy behaviors: eliminated carbonated beverages and adequate fluid intake  Patient has attended support group via You Tube on Meal Planning. Handouts given and reviewed with patient: Top Notch Protein Foods and Reducing Fat and Calories in Meal Planning  . Patient Instructions   Goals:  1. Nutrition Goal: I will use my food journal to record meal times, serving sizes and bring back to next dietitian visit  2. Water Goal:  Great job with water intake- continue at least 64 oz or greater per day! 3. Exercise Goal:  Make Fitness Orientation appointment at [facility]. Aim at least 2-3 days a week at [facility] - good information on nutrition and check out \"My Plate Kitchen\" for recipes       -Followup visit: 4 weeks with dietitian.          Marlene Guillermo RD, LD, RD, LD  Dietitian- Weight Management [address] [place] [address]

## 2021-11-10 ENCOUNTER — OFFICE VISIT (OUTPATIENT)
Dept: BARIATRICS/WEIGHT MGMT | Age: 21
End: 2021-11-10

## 2021-11-10 ENCOUNTER — OFFICE VISIT (OUTPATIENT)
Dept: BARIATRICS/WEIGHT MGMT | Age: 21
End: 2021-11-10
Payer: MEDICARE

## 2021-11-10 VITALS
HEIGHT: 68 IN | BODY MASS INDEX: 44.41 KG/M2 | WEIGHT: 293 LBS | DIASTOLIC BLOOD PRESSURE: 72 MMHG | TEMPERATURE: 97.9 F | SYSTOLIC BLOOD PRESSURE: 130 MMHG | HEART RATE: 88 BPM

## 2021-11-10 DIAGNOSIS — E66.01 MORBID OBESITY WITH BMI OF 50.0-59.9, ADULT (HCC): Primary | ICD-10-CM

## 2021-11-10 DIAGNOSIS — F41.9 ANXIETY: ICD-10-CM

## 2021-11-10 DIAGNOSIS — E55.9 VITAMIN D DEFICIENCY: ICD-10-CM

## 2021-11-10 DIAGNOSIS — E66.01 MORBID OBESITY WITH BMI OF 50.0-59.9, ADULT (HCC): ICD-10-CM

## 2021-11-10 DIAGNOSIS — Z87.891 HX OF SMOKING: ICD-10-CM

## 2021-11-10 DIAGNOSIS — Z01.818 PRE-OP TESTING: ICD-10-CM

## 2021-11-10 PROCEDURE — G8427 DOCREV CUR MEDS BY ELIG CLIN: HCPCS | Performed by: PHYSICIAN ASSISTANT

## 2021-11-10 PROCEDURE — G8417 CALC BMI ABV UP PARAM F/U: HCPCS | Performed by: PHYSICIAN ASSISTANT

## 2021-11-10 PROCEDURE — 99214 OFFICE O/P EST MOD 30 MIN: CPT | Performed by: PHYSICIAN ASSISTANT

## 2021-11-10 PROCEDURE — 1036F TOBACCO NON-USER: CPT | Performed by: PHYSICIAN ASSISTANT

## 2021-11-10 PROCEDURE — G8484 FLU IMMUNIZE NO ADMIN: HCPCS | Performed by: PHYSICIAN ASSISTANT

## 2021-11-10 RX ORDER — CHOLECALCIFEROL (VITAMIN D3) 1250 MCG
1 CAPSULE ORAL WEEKLY
Qty: 4 CAPSULE | Refills: 2 | Status: SHIPPED | OUTPATIENT
Start: 2021-11-10

## 2021-11-10 NOTE — PATIENT INSTRUCTIONS
Goals: 1. Nutrition Goal: I will use my food journal to record meal times, serving sizes and bring back to next dietitian visit  2. Water Goal:  Great job with water intake- continue at least 64 oz or greater per day! 3. Exercise Goal:  Make Fitness Orientation appointment at [facility].   Aim at least 2-3 days a week at [facility] - good information on nutrition and check out \"My Plate Kitchen\" for recipes

## 2021-11-10 NOTE — PATIENT INSTRUCTIONS
· Behavior modification discussed in detail in regards to dietary habits. · Nutritional education occurred during visit. Continue following recommendations  per dietitian. · Improvement in fitness/exercise discussed with patient and the need for this  with/without surgery. · Psychology evaluation with Dr. Kirti Emerson 1/4/22 and 1/13/22  · Physical Therapy referral prn  · EGD prior to any surgical intervention  · Encouraged to attend support groups. · Goal to lose 5% TBW prior to surgery. · Seca scale next month  · Initial labs completed and reviewed. · Triglycerides elevated at 197/ HDL low at 33. AST 39/ ALT 57. To follow up with PCP. · Vit D 18- will start weekly Vit D. Take with food. · UDS (+) for Cannabinoids. Will need to obtain a medical marijuana card in the next 60 days. · Nicotine and Cotinine (+)- Quit smoking 10/1/21. Will repeat Nicotine level. Will need to be nicotine free at least 90 days prior to surgery and lifelong post-op. · EKG completed and reviewed. · Advised not to get pregnant for 18-24 months post bariatric surgery as this can increase risk of malnourishment potentially leading to low birth weight or malformation. ( Nexplanon implant)  · Will need to be off work for 3-4 weeks post-bariatric surgery. · No lifting/pushing/pulling over 20# for 4 weeks post-op  · No NSAIDS 10 days prior to bariatric surgery.  Avoid NSAID use post-op  · Discussed Lovenox post-op bariatric surgery  · Awaiting LOMN

## 2021-11-10 NOTE — PROGRESS NOTES
[address] [facility]  [address]  Frankey Mormon, [facility]  [phone]      Visit Date:  11/10/2021  Weight Management Pre-Op Follow-up    HPI:    Medically Supervised follow-up- Month #1 of 6-desires murtaza Kinsey is here today for continued supervised weight management of morbid obesity. Reports that she has struggled with her weight most of her life. Has tried multiple ways to lose weight. Has been able to lose up to 50# with Keto, but was not able to sustain weight loss. Has gained 80-90# in the last 2 years, since pregnancy. Reports that her weight is affecting her physically, socially and mentally. Reports back pain that has gotten worse as weight has increased. Weight today 345#. Up 5# since last appointment. BMI 53. She reports that she is working on the behavior changes discussed at her initial appointment. Has started tracking all food intake on [employer]. Has been trying to eat 3 meals daily. Tends to skip breakfast. Rarely snacks. Has decreased frequency of eating out. Tends to crave junk food/ chips/sweets. Admits that she struggles with portion control. \" I never feel full. \" tends to eat too quickly. Discuss taking 25-30 minutes to eat meal.  Drinks plenty of water. Only drinks water. Does note drink pop/ energy drinks/ juice/ coffee. Does not drink alcohol. Hx of smoking 1/2 ppd x 6 years. Quit smoking cigarettes 10/1/21 cold turkey. Exposed to second hand smoke routinely through multiple family members/ fiance. Recently has been having her family/fiance smoke outside. Nicotine and cotinine (+) in labs. Understands that she must be nicotine free at least 90 days prior to surgery and lifelong post-op. Discussed risks a/w bariatric surgery and smoking. Will repeat nicotine. Admits that she smokes marijuana routinely for stress/ anxiety. UDS (+) for Cannabinoids. Will need to obtain a medical marijuana card.   Has a good support system through family. Long discussion on importance of lifestyle changes, making better decisions with nutrition and increasing dedicated activity to be successful lifelong with weight loss with or without bariatric surgery. Initial labs completed and reviewed. Triglycerides elevated at 197/ HDl low at 33. AST 39/ ALT 57- to follow up with PCP. Requesting referral for PCP as she does not currently have one. Vit D 18- will start weekly Vit D. Awaiting LOMN. Has not yet completed support groups. EKG completed and reviewed. Discuss pre-op EGD. Psychological clearance with Dr. Luis Zhu 22 and 22. If she does not lose enough weight with medical management she would like to proceed with sleeve gastrectomy. Physical Activity:  Walking 15 minutes-2 times per week. [employer] 1 times per week. Current BMI: Body mass index is 53.24 kg/m².   Current Weight:   Wt Readings from Last 3 Encounters:   11/10/21 (!) 345 lb (156.5 kg)   21 (!) 343 lb 12.8 oz (155.9 kg)   21 (!) 340 lb 12.8 oz (154.6 kg)     Initial Body Weight:340    Past Medical History:  Past Medical History:   Diagnosis Date    Anemia [id number]    during pregnancy, taking extra iron    Depression     no meds    Hypertension     couple weeks 2020    Psoriasis     age 12       Past Surgical History:  Past Surgical History:   Procedure Laterality Date    ABDOMINAL HERNIA REPAIR  18 months    TONSILLECTOMY      age 10   Shannan Gareth TONSILLECTOMY AND ADENOIDECTOMY  age 10       Past Social History:  Social History     Socioeconomic History    Marital status: Single     Spouse name: Not on file    Number of children: Not on file    Years of education: Not on file    Highest education level: Not on file   Occupational History    Not on file   Tobacco Use    Smoking status: Former Smoker     Packs/day: 1.00     Start date: 2019     Quit date: 10/1/2021     Years since quittin.1    Smokeless tobacco: Never Used   Vaping Use    Vaping Use: Never used   Substance and Sexual Activity    Alcohol use: Yes     Comment: social     Drug use: Yes     Comment: \"smoked pot at a party couple weeks ago\"    Sexual activity: Yes     Partners: Male   Other Topics Concern    Not on file   Social History Narrative    Not on file     Social Determinants of Health     Financial Resource Strain:     Difficulty of Paying Living Expenses: Not on file   Food Insecurity:     Worried About [address] in the Last Year: Not on file    Gerardo of Food in the Last Year: Not on file   Transportation Needs:     Lack of Transportation (Medical): Not on file    Lack of Transportation (Non-Medical): Not on file   Physical Activity:     Days of Exercise per Week: Not on file    Minutes of Exercise per Session: Not on file   Stress:     Feeling of Stress : Not on file   Social Connections:     Frequency of Communication with Friends and Family: Not on file    Frequency of Social Gatherings with Friends and Family: Not on file    Attends Sabianism Services: Not on file    Active Member of [address] [place] [address] or Organizations: Not on file    Attends Club or Organization Meetings: Not on file    Marital Status: Not on file   Intimate Partner Violence:     Fear of Current or Ex-Partner: Not on file    Emotionally Abused: Not on file    Physically Abused: Not on file    Sexually Abused: Not on file   Housing Stability:     Unable to Pay for Housing in the Last Year: Not on file    Number of [place] in the Last Year: Not on file    Unstable Housing in the Last Year: Not on file        Medications:   Current Outpatient Medications   Medication Sig Dispense Refill    Cholecalciferol (VITAMIN D3) 1.25 MG (99783 UT) CAPS Take 1 capsule by mouth once a week 4 capsule 2     No current facility-administered medications for this visit. Allergies:   No Known Allergies    Subjective:    Review of Systems:  Constitutional: Denies any fever, chills, fatigue.   Wound: for: PREALBUMIN     VITAMIN B12   No results found for: [id number]     VITAMIN D   No results found for: VITD25     PTH  No results found for: IPTH    VITAMIN B1/ THIAMINE   No results found for: [id number]     LIPID SCREEN (FASTING)   No results found for: CHOL, TRIG, HDL, LDLCALC,     HGA1C (T2DM ONLY)   No results found for: LABA1C, AVGG     TSH   No results found for: TSH     IRON   No results found for: IRON     TIBC  No results found for: TIBC    FERRITIN  No results found for: FERRITIN    VITAMIN A  No results found for: RETINOL    NICOTINE  No results found for: NMET    UDS  No results found for: UDP    PSA  No results found for: LABPSA    GFR  Lab Results   Component Value Date    LABGLOM >90 03/05/2020       DEXA  No results found for this or any previous visit. Assessment:       Diagnosis Orders   1. BMI 50.0-59.9, adult ([facility] [address]     2. Anxiety     3. Vitamin D deficiency  Cholecalciferol (VITAMIN D3) 1.25 MG (88605 UT) CAPS   4. Hx of smoking  Nicotine, Blood   5. Pre-op testing  Nicotine, Blood   6. Morbid obesity with BMI of 50.0-59.9, adult ([facility] [address]         Plan:    · Behavior modification discussed in detail in regards to dietary habits. · Nutritional education occurred during visit. Continue following recommendations  per dietitian. · Improvement in fitness/exercise discussed with patient and the need for this  with/without surgery. · Psychology evaluation with Dr. Annabelle Yan 1/4/22 and 1/13/22  · Physical Therapy referral prn  · EGD prior to any surgical intervention  · Encouraged to attend support groups. · Goal to lose 5% TBW prior to surgery. · Seca scale next month  · Initial labs completed and reviewed. · Triglycerides elevated at 197/ HDL low at 33. AST 39/ ALT 57. To follow up with PCP. · Vit D 18- will start weekly Vit D. Take with food. · UDS (+) for Cannabinoids. Will need to obtain a medical marijuana card in the next 60 days. · Nicotine and Cotinine (+)- Quit smoking 10/1/21. Will repeat Nicotine level. Will need to be nicotine free at least 90 days prior to surgery and lifelong post-op. Order given to repeat nicotine. · EKG completed and reviewed. · Advised not to get pregnant for 18-24 months post bariatric surgery as this can increase risk of malnourishment potentially leading to low birth weight or malformation. ( Nexplanon implant)  · Will need to be off work for 3-4 weeks post-bariatric surgery. · No lifting/pushing/pulling over 20# for 4 weeks post-op  · No NSAIDS 10 days prior to bariatric surgery. Avoid NSAID use post-op  · Discussed Lovenox post-op bariatric surgery  · Awaiting LOMN  · Needs PCP-requesting referral for PCP in [address] [place] [address]. Labs given to patient to review with PCP. Return in about 1 month (around 12/10/2021) for Follow up. I spent over 30 minutes with the patient, with greater that 50% of that time spent on education, counseling and coordination of care.      Electronically signed by Jacqueline Severs, PA on 11/10/2021 at 2:41 PM

## 2022-10-08 NOTE — ED PROVIDER NOTES
Extraocular Movements: Extraocular movements intact. Pupils: Pupils are equal, round, and reactive to light. Neck:      Musculoskeletal: Normal range of motion and neck supple. No neck rigidity or muscular tenderness. Vascular: No carotid bruit. Cardiovascular:      Rate and Rhythm: Normal rate and regular rhythm. Pulses: Normal pulses. Heart sounds: Normal heart sounds, S1 normal and S2 normal. Heart sounds not distant. No murmur. No friction rub. No gallop. Pulmonary:      Effort: Pulmonary effort is normal. No tachypnea, bradypnea, accessory muscle usage, prolonged expiration, respiratory distress or retractions. Breath sounds: Normal breath sounds. Abdominal:      General: Abdomen is flat. Bowel sounds are normal. There is no distension or abdominal bruit. There are no signs of injury. Palpations: Abdomen is soft. There is no shifting dullness, fluid wave, hepatomegaly, splenomegaly, mass or pulsatile mass. Tenderness: There is no abdominal tenderness. There is no guarding or rebound. Hernia: No hernia is present. Musculoskeletal: Normal range of motion. General: No swelling, tenderness, deformity or signs of injury. Right lower leg: No edema. Left lower leg: No edema. Lymphadenopathy:      Cervical: No cervical adenopathy. Skin:     General: Skin is warm and dry. Capillary Refill: Capillary refill takes less than 2 seconds. Neurological:      General: No focal deficit present. Mental Status: She is alert and oriented to person, place, and time. Mental status is at baseline. GCS: GCS eye subscore is 4. GCS verbal subscore is 5. GCS motor subscore is 6. Cranial Nerves: Cranial nerves are intact. Psychiatric:         Attention and Perception: Attention normal.         Mood and Affect: Mood normal.         Speech: Speech normal.         Behavior: Behavior normal. Behavior is cooperative.          Thought Content: Thought content normal.         Cognition and Memory: Cognition and memory normal.         Judgment: Judgment normal.          DIFFERENTIAL DIAGNOSIS:   Dehydration, preeclampsia, active labor    DIAGNOSTIC RESULTS     EKG: All EKG's are interpreted by the Emergency Department Physician who either signs or Co-signs this chart in the absence of a cardiologist.    None    RADIOLOGY: non-plainfilm images(s) such as CT, Ultrasound and MRI are read by the radiologist.    No orders to display       LABS:     Labs Reviewed - No data to display    EMERGENCY DEPARTMENT COURSE:   Vitals:    Vitals:    03/05/20 1649 03/05/20 1758 03/05/20 1938   BP: 128/82 (!) 143/84 132/84   Pulse: 89 85 85   Resp: 18 18 16   Temp: 98.6 °F (37 °C)     TempSrc: Oral     SpO2: 98% 98% 98%   Weight: 245 lb (111.1 kg)     Height: 5' 7\" (1.702 m)         5:29 PM: The patient was seen and evaluated. MDM:  Patient seen and evaluated for dizziness, nausea, emesis. Patient evaluated by labor and delivery nurses, cleared and sent to the emergency department for further evaluation. Initial readings showed the patient was mildly hypertensive. Contacted OB/GYN on call Dr. Gabriel Atkinson. Patient was hydrated with IV fluids, pain was treated appropriately. Patient was observed remained stable throughout ED stay. Patient reported that symptoms improved. Patient observed to eat meal from [facility], stated that she felt better and was ready to go home. Patient ambulated by nursing staff with steady gait. Instructed to return to the emergency department if symptoms persist    CRITICAL CARE:   None    CONSULTS:  Dr. Gabriel Atkinson, OB/GYN    PROCEDURES:  None    FINAL IMPRESSION      1. Dizziness    2.  Nonintractable episodic headache, unspecified headache type          DISPOSITION/PLAN   Discharge    PATIENT REFERRED TO:  Cee Lester DO  [address] [place] [address]  [phone]    Go to   As needed      DISCHARGE Detail Level: Detailed Depth Of Biopsy: dermis Was A Bandage Applied: Yes Size Of Lesion In Cm: 0 Biopsy Type: H and E Biopsy Method: Dermablade Anesthesia Type: 1% lidocaine with epinephrine Anesthesia Volume In Cc (Will Not Render If 0): 0.5 Hemostasis: Drysol Wound Care: Petrolatum Dressing: bandage Destruction After The Procedure: No Type Of Destruction Used: Curettage Curettage Text: The wound bed was treated with curettage after the biopsy was performed. Cryotherapy Text: The wound bed was treated with cryotherapy after the biopsy was performed. Electrodesiccation Text: The wound bed was treated with electrodesiccation after the biopsy was performed. Electrodesiccation And Curettage Text: The wound bed was treated with electrodesiccation and curettage after the biopsy was performed. Silver Nitrate Text: The wound bed was treated with silver nitrate after the biopsy was performed. Lab: -52 Lab Facility: 3 Consent: Written consent was obtained and risks were reviewed including but not limited to scarring, infection, bleeding, scabbing, incomplete removal, nerve damage and allergy to anesthesia. Post-Care Instructions: I reviewed with the patient in detail post-care instructions. Patient is to keep the biopsy site dry overnight, and then apply bacitracin twice daily until healed. Patient may apply hydrogen peroxide soaks to remove any crusting. Notification Instructions: Patient will be notified of biopsy results. However, patient instructed to call the office if not contacted within 2 weeks. Billing Type: Third-Party Bill Information: Selecting Yes will display possible errors in your note based on the variables you have selected. This validation is only offered as a suggestion for you. PLEASE NOTE THAT THE VALIDATION TEXT WILL BE REMOVED WHEN YOU FINALIZE YOUR NOTE. IF YOU WANT TO FAX A PRELIMINARY NOTE YOU WILL NEED TO TOGGLE THIS TO 'NO' IF YOU DO NOT WANT IT IN YOUR FAXED NOTE.